# Patient Record
Sex: MALE | Race: WHITE
[De-identification: names, ages, dates, MRNs, and addresses within clinical notes are randomized per-mention and may not be internally consistent; named-entity substitution may affect disease eponyms.]

---

## 2017-03-20 ENCOUNTER — HOSPITAL ENCOUNTER (OUTPATIENT)
Dept: HOSPITAL 84 - D.ECHO | Age: 77
Discharge: HOME | End: 2017-03-20
Attending: PSYCHIATRY & NEUROLOGY
Payer: MEDICARE

## 2017-03-20 VITALS — BODY MASS INDEX: 32.9 KG/M2

## 2017-03-20 DIAGNOSIS — M47.9: ICD-10-CM

## 2017-03-20 DIAGNOSIS — G45.0: Primary | ICD-10-CM

## 2017-03-28 NOTE — EC
PATIENT:LIANET PATIÑO             DATE OF SERVICE: 03/20/17
SEX: M                                  MEDICAL RECORD: [id number]
DATE OF BIRTH: [birth year]                        [facility]          
AGE OF PATIENT: 76                             ADMISSION DATE: 03/20/17
 
REFERRING PHYSICIAN:                               
 
INTERPRETING PHYSICIAN: EVAN HERMOSILLO MD             
 
 
 
                             ECHOCARDIOGRAM REPORT
  ECHO CHARGES 4               ECHO COMPLETE            
 
 
 
CLINICAL DIAGNOSIS: TIA    HX OF                  
                    CAD/STENT/PACER/HTN           
                         ECHOCARDIOGRAPHIC MEASUREMENTS
      (adult normal given)
        AC root (d.<3.7cm) 3.7    LV Septum d (<1.2 cm> 2.2 
           Valve Excursion 1.3      LV Septum (systole) 2.5 
     Left Atria (s.<4.0cm> 4.0           LVPW d(<1.2cm) 1.6 
             RV (d.<2.3cm) 4.3            LVPW (sytole) 1.8 
       LV diastole(<5.6CM) 3.7        MV E-F(>70mm/sec)     
                LV systole 2.9            LVOT Diameter 1.8 
            MV exc.(>10mm)     
Est.ejection fraction (50-75%)     Pericardial Effusion N
 
   DOPPLER:
     LVIT       A 40.0 E 104 
       LA      RVSP     
     LVOT 90   AOP1/2T     
  Asc. Ao 115 
     RVOT 95  
       RA     
        
 AV Gradient Peak 5.33  AV Mean 2.20  AV Area 2.0 
 MV Gradient Peak 6.81  MV Mean 2.12  MV Area     
   COMMENTS:                                              
 
 
 Cardiac Sonographer: Rafi JIANG              
      Cardiologist:Rafi Amaya                
             TAPE# PACS           
                                                                                     
 
 
DATE OF SERVICE:  03/20/2017
 
Echocardiogram
 
FINDINGS:
1.  Left ventricular chamber size is within normal limits.  Left ventricular
systolic function is normal.  Overall ejection fraction estimated at 55%.
2.  Left atrium, right atrium, and right ventricular chamber sizes are upper
limits of normal, left atrium measures 4.0 cm.
3.  Valvular structures have normal structure and motion.
 
 
 
ECHOCARDIOGRAM REPORT                          [id number]    LIANET PATIÑO     
 
 
4.  Doppler interrogation only reveals trace mitral regurgitation that is not
hemodynamically significant.  No other valvular insufficiency or stenosis.
5.  No evidence of pericardial effusion or left ventricular thrombus.
 
TRANSINT:[id number] Voice Confirmation ID: [id number] DOCUMENT ID: [id number]
                                           
                                           EVAN HERMOSILLO MD             
 
 
 
Electronically Signed by EVAN HERMOSILLO on 03/28/17 at 2070
 
 
 
 
 
 
 
 
 
 
 
 
 
 
 
 
 
 
 
 
 
 
 
 
 
 
 
 
 
 
 
 
 
 
 
CC:                                                             [id number]
DICTATION DATE: 03/20/17 1547     :     03/21/17 0842      DEP CLI 
                                                                      03/20/17
[id number] [place] [address]

## 2017-04-18 ENCOUNTER — HOSPITAL ENCOUNTER (OUTPATIENT)
Dept: HOSPITAL 84 - D.CT | Age: 77
Discharge: HOME | End: 2017-04-18
Attending: PSYCHIATRY & NEUROLOGY
Payer: MEDICARE

## 2017-04-18 VITALS — BODY MASS INDEX: 32.9 KG/M2

## 2017-04-18 DIAGNOSIS — G45.0: Primary | ICD-10-CM

## 2017-04-18 DIAGNOSIS — I67.2: ICD-10-CM

## 2017-10-02 ENCOUNTER — HOSPITAL ENCOUNTER (INPATIENT)
Dept: HOSPITAL 84 - D.M2 | Age: 77
LOS: 3 days | Discharge: HOME | DRG: 190 | End: 2017-10-05
Attending: FAMILY MEDICINE | Admitting: FAMILY MEDICINE
Payer: MEDICARE

## 2017-10-02 VITALS — DIASTOLIC BLOOD PRESSURE: 84 MMHG | SYSTOLIC BLOOD PRESSURE: 117 MMHG

## 2017-10-02 VITALS — SYSTOLIC BLOOD PRESSURE: 118 MMHG | DIASTOLIC BLOOD PRESSURE: 76 MMHG

## 2017-10-02 VITALS — BODY MASS INDEX: 33.5 KG/M2 | BODY MASS INDEX: 21.4 KG/M2

## 2017-10-02 VITALS — DIASTOLIC BLOOD PRESSURE: 76 MMHG | SYSTOLIC BLOOD PRESSURE: 118 MMHG

## 2017-10-02 DIAGNOSIS — J18.9: ICD-10-CM

## 2017-10-02 DIAGNOSIS — I48.91: ICD-10-CM

## 2017-10-02 DIAGNOSIS — M25.512: ICD-10-CM

## 2017-10-02 DIAGNOSIS — E11.9: ICD-10-CM

## 2017-10-02 DIAGNOSIS — Z95.0: ICD-10-CM

## 2017-10-02 DIAGNOSIS — Z86.73: ICD-10-CM

## 2017-10-02 DIAGNOSIS — G47.33: ICD-10-CM

## 2017-10-02 DIAGNOSIS — Z87.891: ICD-10-CM

## 2017-10-02 DIAGNOSIS — Z79.01: ICD-10-CM

## 2017-10-02 DIAGNOSIS — J44.0: Primary | ICD-10-CM

## 2017-10-02 DIAGNOSIS — J44.1: ICD-10-CM

## 2017-10-02 LAB
INR PPP: 3.08 (ref 0.85–1.17)
PROTHROMBIN TIME: 32 SECONDS (ref 11.6–15)

## 2017-10-02 NOTE — NUR
PAGE AND RETURN CALL TO DR LEPE TO DISCUSS PATIENT'S C/O LEFT SHOULDER
PAIN. ORDERS RECIEVED. WILL ALSO CONSULT ORTHO FOR EVALUATION.

## 2017-10-02 NOTE — NUR
NEW ADMIT FROM DR. LOZANO OFFICE.  ESCORTED FROM ADMISSIONS BY W/C.
ORININTED TO ROOM.  CALL LIGHT IN REACH.  WILL CONT. PLAN OF CARE.

## 2017-10-03 VITALS — SYSTOLIC BLOOD PRESSURE: 115 MMHG | DIASTOLIC BLOOD PRESSURE: 49 MMHG

## 2017-10-03 VITALS — SYSTOLIC BLOOD PRESSURE: 127 MMHG | DIASTOLIC BLOOD PRESSURE: 78 MMHG

## 2017-10-03 VITALS — DIASTOLIC BLOOD PRESSURE: 69 MMHG | SYSTOLIC BLOOD PRESSURE: 125 MMHG

## 2017-10-03 VITALS — DIASTOLIC BLOOD PRESSURE: 56 MMHG | SYSTOLIC BLOOD PRESSURE: 126 MMHG

## 2017-10-03 VITALS — DIASTOLIC BLOOD PRESSURE: 76 MMHG | SYSTOLIC BLOOD PRESSURE: 133 MMHG

## 2017-10-03 VITALS — SYSTOLIC BLOOD PRESSURE: 140 MMHG | DIASTOLIC BLOOD PRESSURE: 83 MMHG

## 2017-10-03 LAB
ALBUMIN SERPL-MCNC: 2.8 G/DL (ref 3.4–5)
ALP SERPL-CCNC: 105 U/L (ref 46–116)
ALT SERPL-CCNC: 31 U/L (ref 10–68)
ANION GAP SERPL CALC-SCNC: 8.2 MMOL/L (ref 8–16)
BASOPHILS NFR BLD AUTO: 0.1 % (ref 0–2)
BILIRUB SERPL-MCNC: 1.1 MG/DL (ref 0.2–1.3)
BUN SERPL-MCNC: 23 MG/DL (ref 7–18)
CALCIUM SERPL-MCNC: 9.6 MG/DL (ref 8.5–10.1)
CHLORIDE SERPL-SCNC: 96 MMOL/L (ref 98–107)
CO2 SERPL-SCNC: 30.7 MMOL/L (ref 21–32)
CREAT SERPL-MCNC: 1 MG/DL (ref 0.6–1.3)
EOSINOPHIL NFR BLD: 0 % (ref 0–7)
ERYTHROCYTE [DISTWIDTH] IN BLOOD BY AUTOMATED COUNT: 13 % (ref 11.5–14.5)
GLOBULIN SER-MCNC: 3.8 G/L
GLUCOSE SERPL-MCNC: 257 MG/DL (ref 74–106)
HCT VFR BLD CALC: 34.7 % (ref 42–54)
HGB BLD-MCNC: 11.6 G/DL (ref 13.5–17.5)
IMM GRANULOCYTES NFR BLD: 0.5 % (ref 0–5)
INR PPP: 2.91 (ref 0.85–1.17)
LYMPHOCYTES NFR BLD AUTO: 4.7 % (ref 15–50)
MCH RBC QN AUTO: 32.2 PG (ref 26–34)
MCHC RBC AUTO-ENTMCNC: 33.4 G/DL (ref 31–37)
MCV RBC: 96.4 FL (ref 80–100)
MONOCYTES NFR BLD: 8.1 % (ref 2–11)
NEUTROPHILS NFR BLD AUTO: 86.6 % (ref 40–80)
OSMOLALITY SERPL CALC.SUM OF ELEC: 275 MOSM/KG (ref 275–300)
PLATELET # BLD: 139 10X3/UL (ref 130–400)
PMV BLD AUTO: 10.4 FL (ref 7.4–10.4)
POTASSIUM SERPL-SCNC: 3.9 MMOL/L (ref 3.5–5.1)
PROT SERPL-MCNC: 6.6 G/DL (ref 6.4–8.2)
PROTHROMBIN TIME: 30.7 SECONDS (ref 11.6–15)
RBC # BLD AUTO: 3.6 10X6/UL (ref 4.2–6.1)
SODIUM SERPL-SCNC: 131 MMOL/L (ref 136–145)
WBC # BLD AUTO: 15.2 10X3/UL (ref 4.8–10.8)

## 2017-10-03 NOTE — NUR
DURING 2100 MED PASS, PT REFUSED NAPROXEN DUE TO CONCERN OVER IMPACT AS A
BLOOD THINNER. PT'S COUMADIN AND ASA ARE BEING HELD IN PREPARATION FOR A CT
ARTHROGRAM TO BE DONE ON THURSDAY AS LONG AS INR COMES DOWN. I EXPLAINED THAT
IT WAS THE PT'S CHOICE TO REFUSE THE MED AND I UNDERSTOOD HIS CONCERN. I
ADVISED HIM TO DISCUSS HIS CONCERN WITH HIS MD. HE STATES THAT HIS SHOULDER
PAIN IS NOT TOO BAD AT THE MOMENT. THE PRN PERCOCETS ARE WORKING VERY WELL,
SOHE DOESN'T REALLY NEED THE NAPROXEN. WILL CONT TO MONITOR.

## 2017-10-03 NOTE — NUR
DR THOMPSON ORDERED CT SCAN WITH ARTHROGRAM. CT SAID THAT PT PT AND INR ARE TOO
ELEVATED AND DR PINEDA SAID TO HOLD ALL BLOOD THINNERS INCLUDING ASA AND
COUMADIN UNTIL AFTER PTS SCAN PLANNED FOR THURSDAY. PLACED MEDICATIONS ON HOLD
AS ORDERED. PT TO BE NPO AFTER MIDNIGHT FOR SCAN PLANNED ON THURSDAY 10/5,
ORDER IS ALREADY IN COMPUTER. PT UPDATED ON THIS INFORMATION

## 2017-10-03 NOTE — NUR
PT ACCIDENTALLY PULLED HIS PIV OUT CATH TIP INTACT. PT IS EATING LUNCH RIGHT
NOW WILL CALL WHEN READY FOR ME TO RESITE HIS PIV

## 2017-10-03 NOTE — NUR
ROUNDING NOTE: PT IS AWAKE, A&O X3 SITTING UP IN BED. PT HAS LEFT FOREARM
SALINE LOC. HE IS WEARING HIS OWN ELIAS HOSE AND REFUSES TO WEAR OUR SCDS FOR
DVT PROPHYLAXIS. PT REPORTS CONTINUED LEFT SHOULDER PAIN, BUT STATES THAT HIS
PAIN HAS IMPROVED NICELY WITH PO PERCOCET PER MD ORDER. PT'S COUMADIN AND ASA
ARE BEING HELD IN PREP FOR CT ARTHROGRAM ON THURSDAY TO FURTHER EVALUATE HIS
LEFT SHOULDER PAIN. PT IS UP AD INGRID TO BR. REPORTS VOIDING AND BM X1. DENIES
ANY OTHER NEEDS, OTHER THAN KEEPING DOOR SHUT. WILL CONT TO MONITOR.

## 2017-10-04 VITALS — SYSTOLIC BLOOD PRESSURE: 132 MMHG | DIASTOLIC BLOOD PRESSURE: 57 MMHG

## 2017-10-04 VITALS — SYSTOLIC BLOOD PRESSURE: 113 MMHG | DIASTOLIC BLOOD PRESSURE: 63 MMHG

## 2017-10-04 VITALS — DIASTOLIC BLOOD PRESSURE: 68 MMHG | SYSTOLIC BLOOD PRESSURE: 133 MMHG

## 2017-10-04 VITALS — SYSTOLIC BLOOD PRESSURE: 136 MMHG | DIASTOLIC BLOOD PRESSURE: 24 MMHG

## 2017-10-04 VITALS — DIASTOLIC BLOOD PRESSURE: 77 MMHG | SYSTOLIC BLOOD PRESSURE: 148 MMHG

## 2017-10-04 LAB
ALBUMIN SERPL-MCNC: 2.6 G/DL (ref 3.4–5)
ALP SERPL-CCNC: 102 U/L (ref 46–116)
ALT SERPL-CCNC: 28 U/L (ref 10–68)
ANION GAP SERPL CALC-SCNC: 8.1 MMOL/L (ref 8–16)
BASOPHILS NFR BLD AUTO: 0 % (ref 0–2)
BILIRUB SERPL-MCNC: 0.51 MG/DL (ref 0.2–1.3)
BUN SERPL-MCNC: 29 MG/DL (ref 7–18)
CALCIUM SERPL-MCNC: 9.5 MG/DL (ref 8.5–10.1)
CHLORIDE SERPL-SCNC: 92 MMOL/L (ref 98–107)
CO2 SERPL-SCNC: 29.6 MMOL/L (ref 21–32)
CREAT SERPL-MCNC: 0.9 MG/DL (ref 0.6–1.3)
EOSINOPHIL NFR BLD: 0 % (ref 0–7)
ERYTHROCYTE [DISTWIDTH] IN BLOOD BY AUTOMATED COUNT: 12.7 % (ref 11.5–14.5)
GLOBULIN SER-MCNC: 4.1 G/L
GLUCOSE SERPL-MCNC: 212 MG/DL (ref 74–106)
HCT VFR BLD CALC: 34.4 % (ref 42–54)
HGB BLD-MCNC: 11.9 G/DL (ref 13.5–17.5)
IMM GRANULOCYTES NFR BLD: 0.5 % (ref 0–5)
INR PPP: 3.04 (ref 0.85–1.17)
LYMPHOCYTES NFR BLD AUTO: 4.9 % (ref 15–50)
MCH RBC QN AUTO: 32.8 PG (ref 26–34)
MCHC RBC AUTO-ENTMCNC: 34.6 G/DL (ref 31–37)
MCV RBC: 94.8 FL (ref 80–100)
MONOCYTES NFR BLD: 7.9 % (ref 2–11)
NEUTROPHILS NFR BLD AUTO: 86.7 % (ref 40–80)
OSMOLALITY SERPL CALC.SUM OF ELEC: 264 MOSM/KG (ref 275–300)
PLATELET # BLD: 167 10X3/UL (ref 130–400)
PMV BLD AUTO: 10.2 FL (ref 7.4–10.4)
POTASSIUM SERPL-SCNC: 3.7 MMOL/L (ref 3.5–5.1)
PROT SERPL-MCNC: 6.7 G/DL (ref 6.4–8.2)
PROTHROMBIN TIME: 31.7 SECONDS (ref 11.6–15)
RBC # BLD AUTO: 3.63 10X6/UL (ref 4.2–6.1)
SODIUM SERPL-SCNC: 126 MMOL/L (ref 136–145)
WBC # BLD AUTO: 15.2 10X3/UL (ref 4.8–10.8)

## 2017-10-04 NOTE — NUR
PT STATES THAT HE HAS HAD A BM EARLIER THAT WAS MORE FORMED FROM THE ONES
BEFORE. PT STATES HE THINKS THE ANTIBIOTICS ARE CAUSING HIM TO HAVE DIARRHEA
AND WOULD LIKE TO START TAKING A PROBIOTIC. PT EATING CHOCOLATE ICE CREAM FOR
HIS HS SNACK. DENIES ANY OTHER NEEDS. CONTINUE TO MONITOR CLOSELY.

## 2017-10-04 NOTE — NUR
DR THOMPSON CANCELLED PT CT SCAN AND ARTHROGRAM. RESTARTED BLOOD THINNERS SINCE
THEY WERE ON HOLD FOR THIS SCAN.

## 2017-10-04 NOTE — NUR
PT HAS EXCORIATED PERINEAL AREA. CALLED DR PETERSON OFFICE TO ASK FOR AN ORDER
FOR CALMOSEPTINE. SPOKE WITH NURSE, SHE WILL CALL ME BACK

## 2017-10-04 NOTE — NUR
PT LYING IN BED, TALKING ON HIS CELL PHONE, AWAKE, ALERT, ORIENTED. PT DENIES
ANY ACUTE NEEDS AT THIS TIME. WILL GIVE PT AN HS SNACK WITH HIS INSULIN.
CONTINUE TO MONITOR PT CLOSELY. BED LOW, CALL LIGHT IN REACH, SIDE RAILS X 2,
HOB 30 DEGREES.

## 2017-10-05 VITALS — SYSTOLIC BLOOD PRESSURE: 122 MMHG | DIASTOLIC BLOOD PRESSURE: 68 MMHG

## 2017-10-05 LAB
ALBUMIN SERPL-MCNC: 2.5 G/DL (ref 3.4–5)
ALP SERPL-CCNC: 84 U/L (ref 46–116)
ALT SERPL-CCNC: 31 U/L (ref 10–68)
ANION GAP SERPL CALC-SCNC: 10.4 MMOL/L (ref 8–16)
BASOPHILS NFR BLD AUTO: 0 % (ref 0–2)
BILIRUB SERPL-MCNC: 0.5 MG/DL (ref 0.2–1.3)
BUN SERPL-MCNC: 27 MG/DL (ref 7–18)
CALCIUM SERPL-MCNC: 9.2 MG/DL (ref 8.5–10.1)
CHLORIDE SERPL-SCNC: 98 MMOL/L (ref 98–107)
CO2 SERPL-SCNC: 29.5 MMOL/L (ref 21–32)
CREAT SERPL-MCNC: 0.8 MG/DL (ref 0.6–1.3)
EOSINOPHIL NFR BLD: 0 % (ref 0–7)
ERYTHROCYTE [DISTWIDTH] IN BLOOD BY AUTOMATED COUNT: 12.5 % (ref 11.5–14.5)
GLOBULIN SER-MCNC: 2.9 G/L
GLUCOSE SERPL-MCNC: 203 MG/DL (ref 74–106)
HCT VFR BLD CALC: 33.9 % (ref 42–54)
HGB BLD-MCNC: 11.6 G/DL (ref 13.5–17.5)
IMM GRANULOCYTES NFR BLD: 0.4 % (ref 0–5)
INR PPP: 3.07 (ref 0.85–1.17)
LYMPHOCYTES NFR BLD AUTO: 6.9 % (ref 15–50)
MCH RBC QN AUTO: 32.1 PG (ref 26–34)
MCHC RBC AUTO-ENTMCNC: 34.2 G/DL (ref 31–37)
MCV RBC: 93.9 FL (ref 80–100)
MONOCYTES NFR BLD: 6.9 % (ref 2–11)
NEUTROPHILS NFR BLD AUTO: 85.8 % (ref 40–80)
OSMOLALITY SERPL CALC.SUM OF ELEC: 278 MOSM/KG (ref 275–300)
PLATELET # BLD: 178 10X3/UL (ref 130–400)
PMV BLD AUTO: 10 FL (ref 7.4–10.4)
POTASSIUM SERPL-SCNC: 3.9 MMOL/L (ref 3.5–5.1)
PROT SERPL-MCNC: 5.4 G/DL (ref 6.4–8.2)
PROTHROMBIN TIME: 32 SECONDS (ref 11.6–15)
RBC # BLD AUTO: 3.61 10X6/UL (ref 4.2–6.1)
SODIUM SERPL-SCNC: 134 MMOL/L (ref 136–145)
WBC # BLD AUTO: 13.7 10X3/UL (ref 4.8–10.8)

## 2017-10-05 NOTE — NUR
WENT OVER DC PAPERWORK WITH PT PT VERBALIZES UNDERSTANDING. DC PIV WITH CATH
TIP INTACT. DC TELE AND RETURNED TO MONITOR TECH. PT WAITING ON HIS WIFE TO
GET HERE TO PICK HIM UP. PT WAS GIVEN SCRIPT FOR PERCOCET.

## 2017-10-16 NOTE — CN
PATIENT NAME:LIANET RODRIGUEZ                           MEDICAL RECORD: [id number]
: [birth year]                                              LOCATION:D. D.2130
ADMIT DATE: 10/02/17                                       ACCOUNT: [id number]
CONSULTING PHYSICIAN:    ABHI BURROWS MD              
                                               
REFERRING PHYSICIAN:     KATIE CHAVEZ MD               
 
 
DATE OF CONSULTATION:  10/03/2017
 
Pulmonary Consultation
 
CONSULT REQUESTING PHYSICIAN:  Dr. Lepe.
 
REASON FOR CONSULTATION:  Cough, shortness of breath.
 
HISTORY OF PRESENT ILLNESS:  Mr. Rodriguez is a 76-year-old very pleasant gentleman
who for the last 2-3 days is sick.  He has a fever of 102.  He was coughing, he
was wheezing.  He has shortness of breath.  The patient having a fall and he has
hurt his left shoulder.  Since his admission, his shortness of breath is a bit
better with nebulizer medication.
 
REVIEW OF SYSTEMS:  Mainly in the history of present illness.
 
PAST MEDICAL HISTORY:
1. COPD.
2. Emphysema.
3. Obstructive sleep apnea.
4. Coronary artery disease.
5. History of sick sinus syndrome.
6. Obstructive sleep apnea.  The patient is not using any CPAP machine.
7. Diabetes mellitus.
8. History of DVT.
9. Chronic anticoagulation on Coumadin.
 
PAST SURGICAL HISTORY:
1.  He has status post pacemaker placement.
2.  He has shoulder surgery.
 
ALLERGIES:  There are no known drug allergy.
 
PERSONAL AND SOCIAL HISTORY:  The patient has smoking history of more than
50-pack year.  He quit 5 years ago.  Nondrinker.
 
FAMILY HISTORY:  Noncontributory.
 
PHYSICAL EXAMINATION:
GENERAL:  Now, the patient is lying comfortably.  He is not in acute distress.
VITAL SIGNS:  Blood pressure 125/69, pulse is 83, respiration is 20, temperature
98.2, SPO2 is 93% on room air.
HEENT:  Conjunctivae pink, sclerae nonicteric.
NECK:  Supple, no JVD.
CHEST:  Chest excursion is minimal on both sides.  There is no wheezing or
crackle at the bases heard.
HEART:  Rhythm regular, normal sound, no murmur.
ABDOMEN:  Soft, bowel sounds present.  No hepatosplenomegaly.
RECTAL:  Deferred.
EXTREMITIES:  No cyanosis, no clubbing, no pedal edema.
 
 
 
CONSULT REPORT                                 [id number]    LIANET RODRIGUEZ         
 
 
SKIN:  Warm, normal turgor.
CENTRAL NERVOUS SYSTEM:  The patient is awake and alert.  There are no obvious
cranial nerve abnormalities.  The gait was not tested.
 
LABORATORY DATA:  CBC:  WBC 15.2, hemoglobin 11.6, hematocrit 34.7 and the
platelet count 139.  Chemistry:  Sodium is 131, potassium is 3.9, BUN is 23,
creatinine is 1, glucose 257.  INR is 2.91.  The chest radiograph is not
available for review.
 
IMPRESSION:
1. Acute exacerbation of chronic obstructive pulmonary disease and possible
pneumonia by the chest x-ray at Dr. Lepe's office consistent with a
community-acquired pneumonia.
2. Leukocytosis secondary to pneumonia.
3. Febrile illness secondary to pneumonia.
4. Obstructive sleep apnea, not using the CPAP machine.
5. History of deep venous thrombosis, on chronic anticoagulation.
 
RECOMMENDATION:  Continue methylprednisolone IV, Levaquin IV,
albuterol/ipratropium nebulizer.  Start on Brovana, budesonide nebulizer. 
Follow up labs and chest radiograph in the morning.
 
Dr. Lepe, thank you for involving me in the care of Mr. Rodriguez.
 
TRANSINT:[id number] Voice Confirmation ID: [id number] DOCUMENT ID: [id number]
                                           
                                           ABHI BURROWS MD              
 
 
 
Electronically Signed by ABHI BURROWS on 10/16/17 at 1143
 
 
 
 
 
 
 
 
 
 
 
 
 
 
 
 
CC: KRYSTAL LEPE MD                                  [id number]
DICTATION DATE: 10/03/17 164     :     10/03/17 2317      DIS IN  
                                                                      10/05/17
[address] [place] [address]

## 2017-10-22 ENCOUNTER — HOSPITAL ENCOUNTER (INPATIENT)
Dept: HOSPITAL 84 - D.ER | Age: 77
LOS: 10 days | Discharge: HOME HEALTH | DRG: 872 | End: 2017-11-01
Attending: FAMILY MEDICINE | Admitting: FAMILY MEDICINE
Payer: MEDICARE

## 2017-10-22 VITALS — BODY MASS INDEX: 31.9 KG/M2

## 2017-10-22 VITALS — SYSTOLIC BLOOD PRESSURE: 105 MMHG | DIASTOLIC BLOOD PRESSURE: 58 MMHG

## 2017-10-22 DIAGNOSIS — J44.1: ICD-10-CM

## 2017-10-22 DIAGNOSIS — I48.91: ICD-10-CM

## 2017-10-22 DIAGNOSIS — Z79.01: ICD-10-CM

## 2017-10-22 DIAGNOSIS — G35: ICD-10-CM

## 2017-10-22 DIAGNOSIS — A41.01: Primary | ICD-10-CM

## 2017-10-22 DIAGNOSIS — D64.9: ICD-10-CM

## 2017-10-22 DIAGNOSIS — I10: ICD-10-CM

## 2017-10-22 DIAGNOSIS — J98.09: ICD-10-CM

## 2017-10-22 DIAGNOSIS — B37.0: ICD-10-CM

## 2017-10-22 DIAGNOSIS — J45.901: ICD-10-CM

## 2017-10-22 DIAGNOSIS — E11.9: ICD-10-CM

## 2017-10-22 DIAGNOSIS — Z95.0: ICD-10-CM

## 2017-10-22 DIAGNOSIS — I25.10: ICD-10-CM

## 2017-10-22 LAB
ALBUMIN SERPL-MCNC: 2.8 G/DL (ref 3.4–5)
ALP SERPL-CCNC: 125 U/L (ref 46–116)
ALT SERPL-CCNC: 20 U/L (ref 10–68)
ANION GAP SERPL CALC-SCNC: 10.7 MMOL/L (ref 8–16)
BASOPHILS NFR BLD AUTO: 0.2 % (ref 0–2)
BILIRUB SERPL-MCNC: 0.96 MG/DL (ref 0.2–1.3)
BUN SERPL-MCNC: 15 MG/DL (ref 7–18)
CALCIUM SERPL-MCNC: 9.3 MG/DL (ref 8.5–10.1)
CHLORIDE SERPL-SCNC: 98 MMOL/L (ref 98–107)
CK MB SERPL-MCNC: 0.1 U/L (ref 0–3.6)
CK SERPL-CCNC: 47 UL (ref 21–232)
CO2 SERPL-SCNC: 29.9 MMOL/L (ref 21–32)
CREAT SERPL-MCNC: 0.9 MG/DL (ref 0.6–1.3)
EOSINOPHIL NFR BLD: 0.6 % (ref 0–7)
ERYTHROCYTE [DISTWIDTH] IN BLOOD BY AUTOMATED COUNT: 12.8 % (ref 11.5–14.5)
GLOBULIN SER-MCNC: 4.1 G/L
GLUCOSE SERPL-MCNC: 151 MG/DL (ref 74–106)
HCT VFR BLD CALC: 34.1 % (ref 42–54)
HGB BLD-MCNC: 11.4 G/DL (ref 13.5–17.5)
IMM GRANULOCYTES NFR BLD: 0.2 % (ref 0–5)
LYMPHOCYTES NFR BLD AUTO: 7.4 % (ref 15–50)
MCH RBC QN AUTO: 31.8 PG (ref 26–34)
MCHC RBC AUTO-ENTMCNC: 33.4 G/DL (ref 31–37)
MCV RBC: 95.3 FL (ref 80–100)
MONOCYTES NFR BLD: 12 % (ref 2–11)
NEUTROPHILS NFR BLD AUTO: 79.6 % (ref 40–80)
NT-PROBNP SERPL-MCNC: 258 PG/ML (ref 0–450)
OSMOLALITY SERPL CALC.SUM OF ELEC: 273 MOSM/KG (ref 275–300)
PLATELET # BLD: 137 10X3/UL (ref 130–400)
PMV BLD AUTO: 9.3 FL (ref 7.4–10.4)
POTASSIUM SERPL-SCNC: 3.6 MMOL/L (ref 3.5–5.1)
PROT SERPL-MCNC: 6.9 G/DL (ref 6.4–8.2)
RBC # BLD AUTO: 3.58 10X6/UL (ref 4.2–6.1)
SODIUM SERPL-SCNC: 135 MMOL/L (ref 136–145)
TROPONIN I SERPL-MCNC: < 0.017 NG/ML (ref 0–0.06)
WBC # BLD AUTO: 8.9 10X3/UL (ref 4.8–10.8)

## 2017-10-22 NOTE — HEMODYNAMI
PATIENT:LIANET PATIÑO                              MEDICAL RECORD: [id number]
: [birth year]                                            LOCATION:D.[facility]17
ACCT# [id number]                                       ADMISSION DATE: 10/22/17
 
 
 Generatedon:10/31/804861:58
Patient name: LIANET PATIÑO Patient #: [id number] Visit #: [id number] SSN: :
1940 Date of study: 10/31/2017
Page: Of
Hemodynamic Procedure Report
****************************
Patient Data
Patient Demographics
Procedure consent was obtained
First Name: LIANET           Gender: Male
Last Name: HAROON           : 1940
Middle Initial: JAY      Age: 76 year(s)
Patient #: [id number]       Race: Unknown
Visit #: [id number]
Accession #:
[id number]
Additional ID: [id number]
Contact details
Address: [address] [place] [address]      Phone: [phone]
State: [place]
City: [place]
Zip code: [id number]
Admission
Admission Data
Admission Date: 10/22/2017  Admission Time: 17:56
Room #: D.2217
Procedure
Procedure Types
Cath Procedure
Diagnostic Procedure
BARRIE
Procedure Description
Procedure Date
Procedure Date: 10/31/2017
Procedure Start Time: 10:44
Procedure End Time: 10:57
Procedure Staff
Name                            Function
Hao Morillo MD                Performing Physician
Elissa Feldman RN                  Nurse
Bowen Salinas RT                  Monitor
Procedure Data
Cath Procedure
Fluoroscopy
Diagnostic fluoroscopy      Total fluoroscopy Time: 0
time: 0 min                 min
Diagnostic fluoroscopy      Total fluoroscopy dose: 0
dose: 0 mGy                 mGy
Contrast Material
Contrast Material Type                       Amount (ml)
Isovue 300                                   0
Estimated blood loss: 0 ml
Procedure Complications
No complications
 
Procedure Medications
Medication           Administration Route Dosage
Oxygen               NC                   2 l/min
Hurricaine [place]     P.O.                 1 Sprays
Refer to Anesthesia
Notes for Sedation
Medications
Hemodynamics
Rest
Pre Cath      Intra         NCS           Post Cath
Vital Signs
Time     Heart  Resp   SPO2 etCO2   NIBP (mmHg) Rhythm  Pain    Sedation
Rate   (ipm)  (%)  (mmHg)                      Status  Level
(bpm)
10:37:47 61     13     95   36.3    152/89(134) NSR     0 (11)  10(A)
, No
pain
10:42:13 78     15     96   37      165/92(136) NSR     0 (11)  10(A)
, No
pain
10:46:34 68     26     97   0       140/78(109) NSR     0 (11)  9(A)
, No
pain
10:51:39 75     18     98   9.8     128/80(108) NSR     0 (11)  9(A)
, No
pain
10:54:59 75     23     97   9.8     126/73(92)  NSR     0 (11)  10(A)
, No
pain
Medications
Time     Medication  Route  Dose   Verified Delivered Reason    Notes  Effective
ness
by       by
10:39:06 Oxygen      NC     2      Hao Bowers    used for
l/min  Ilia Feldman RN   procedure
10:40:06 Hurricaine  P.O.   1      Hao Bowers    Per
Spray              Sprays Ilia Feldman RN   physician
10:40:11 Refer to                  Hao Bowers
Anesthesia                Ilia Feldman RN
Notes for
Sedation
Medications
Procedure Log
Time     Note
10:16:04 Bowen Salinas RT(R) sent for patient. Start room use.
10:16:05 Time tracking: Regular hours
10:16:09 Plan of Care:Hemodynamics will remain stable., Cardiac
rhythm will remain stable., Comfort level will be
maintained., Respiratory function will remain
adequate., Patient/ family verbilizes understanding of
procedure., Procedure tolerated without complication.,
Recovers from procedure without complications..
10:32:16 Patient arrived from Med/Surg to CCL 3. Patient
remains on bed/stretcher for procedure.
10:32:17 Warm blankets applied, and joon hugger turned on for
patient comfort.
10:32:18 Correct patient and procedure confirmed by team.
10:32:19 Signed procedure consent form obtained from patient.
10:32:21 ECG and BP/O2 sat monitors applied to patient.
10:32:59 Dr Vee present and monitoring patient for TIVA.
 
10:33:09 Yo Castro Echo Tech present for BARRIE.
10:36:30 Vital chart was started
10:36:34 Rhythm: sinus rhythm
10:36:35 Full Disclosure recording started
10:36:50 H&P Date Dictated: 10/26/2017 Within 30 days and on
chart..
10:36:57 Pre-procedure instructions explained to patient.
10:36:58 Pre-op teaching completed and patient verbalized
understanding.
10:37:00 Family unavailable.
10:37:02 Patient NPO since Midnight.
10:37:03 Is the patient allergic to Iodine/contrast media? No.
10:37:04 Is patient on blood thinner?Yes
10:37:07 **ACC** The patient was administered the following
blood thiners within the last 24 hours: Coumadin
10:37:10 Patient diabetic? Yes.
10:37:12 If diabetic: On Metformin? No
10:37:16 Previous problem with sedation/anesthesia? No ?
10:37:18 Snore? Yes
10:37:19 Sleep apnea? No
10:37:20 Deviated septum? No
10:37:21 Opens mouth fully? Yes
10:37:21 Sticks out tongue? Yes
10:37:27 Airway obstruction? Yes possible COPD
10:37:33 Dentures? Yes OUT
10:37:46 IV patent on arrival in right forearm with 0.9% NaCl
at [facility].
10:37:48 Lab results completed and on chart.
10:38:16 Pt prepped for BARRIE.
10:38:48 --------ALL STOP TIME OUT------
10:38:49 Final Timeout: patient, procedure, and site verified
with staff and physician. All members of the team are
in agreement.
10:38:54 Physical assessment completed. ASA score P 3 - A
patient with severe systemic disease as per Hao Morillo MD.
10:39:03 Sedation plan: TIVA Propofol
10:39:06 Oxygen 2 l/min NC was administered by Elissa Feldman RN;
used for procedure;
10:40:06 Hurricaine Spray 1 Sprays P.O. was administered by
Elissa Feldman RN; Per physician;
10:40:11 Refer to Anesthesia Notes for Sedation Medications was
administered by Elissa Feldman RN; ;
10:44:48 Procedure started.
10:44:50 BARRIE started.
10:48:56 BARRIE completed.
10:49:10 Procedure ended.(Physican Out)
10:49:13 Fluoroscopy time 00.00 minutes.
10:49:14 Fluoroscopy dose: 0 mGy
10:49:14 Flurop Dose total: 0
10:49:16 Contrast amount:Isovue 300 0ml.
10:49:23 Post-procedure physical assessment completed. ASA
score P 3 - A patient with severe systemic disease as
per Hao Morillo MD.
10:49:26 Post procedure rhythm: unchanged.
10:49:28 Estimated blood loss: 0 ml
10:49:30 Post procedure instruction explained to
patient.Patient verbalizes understanding.
10:49:31 Patient needs reinforcement of post procedure
teaching.
 
10:49:35 Procedure and supply charges have been captured,
reviewed, submitted and are correct.
10:49:39 Procedure Complication : No complications
10:55:33 Vital chart was stopped
10:55:33 See physician's report for complete and final results.
10:57:18 Report given to Med/Surg.
10:57:28 Patient transfered to Med/Surg with Bed.
10:57:29 Procedure ended.
10:57:29 Full Disclosure recording stopped
10:57:32 End room use (Document Last)
Signature Audit [place]
Stage           Time        Signature      Unsigned
Intra-Procedure 10/31/2017  Bowen Salinas
10:58:14 AM RT(R)
Signatures
Monitor : Bowen Salinas RT Signature :
______________________________
Date : ______________ Time :
______________
 
 
 
 
 
 
 
 
 
 
 
 
 
 
 
 
 
 
 
 
 
 
 
 
 
 
 
 
 
 
 
 
 
 
 
 
[id number] [place] [address]

## 2017-10-23 VITALS — SYSTOLIC BLOOD PRESSURE: 127 MMHG | DIASTOLIC BLOOD PRESSURE: 69 MMHG

## 2017-10-23 VITALS — SYSTOLIC BLOOD PRESSURE: 139 MMHG | DIASTOLIC BLOOD PRESSURE: 75 MMHG

## 2017-10-23 VITALS — SYSTOLIC BLOOD PRESSURE: 126 MMHG | DIASTOLIC BLOOD PRESSURE: 63 MMHG

## 2017-10-23 VITALS — SYSTOLIC BLOOD PRESSURE: 150 MMHG | DIASTOLIC BLOOD PRESSURE: 73 MMHG

## 2017-10-23 VITALS — DIASTOLIC BLOOD PRESSURE: 63 MMHG | SYSTOLIC BLOOD PRESSURE: 130 MMHG

## 2017-10-23 VITALS — DIASTOLIC BLOOD PRESSURE: 52 MMHG | SYSTOLIC BLOOD PRESSURE: 99 MMHG

## 2017-10-23 LAB
ANION GAP SERPL CALC-SCNC: 9.5 MMOL/L (ref 8–16)
BASOPHILS NFR BLD AUTO: 0.1 % (ref 0–2)
BUN SERPL-MCNC: 13 MG/DL (ref 7–18)
CALCIUM SERPL-MCNC: 9.5 MG/DL (ref 8.5–10.1)
CHLORIDE SERPL-SCNC: 99 MMOL/L (ref 98–107)
CO2 SERPL-SCNC: 29.1 MMOL/L (ref 21–32)
CREAT SERPL-MCNC: 0.9 MG/DL (ref 0.6–1.3)
EOSINOPHIL NFR BLD: 0.5 % (ref 0–7)
ERYTHROCYTE [DISTWIDTH] IN BLOOD BY AUTOMATED COUNT: 13 % (ref 11.5–14.5)
GLUCOSE SERPL-MCNC: 152 MG/DL (ref 74–106)
HCT VFR BLD CALC: 32.4 % (ref 42–54)
HGB BLD-MCNC: 10.4 G/DL (ref 13.5–17.5)
IMM GRANULOCYTES NFR BLD: 0.1 % (ref 0–5)
INR PPP: 2.71 (ref 0.85–1.17)
LYMPHOCYTES NFR BLD AUTO: 9.4 % (ref 15–50)
MCH RBC QN AUTO: 31 PG (ref 26–34)
MCHC RBC AUTO-ENTMCNC: 32.1 G/DL (ref 31–37)
MCV RBC: 96.7 FL (ref 80–100)
MONOCYTES NFR BLD: 13.9 % (ref 2–11)
NEUTROPHILS NFR BLD AUTO: 76 % (ref 40–80)
OSMOLALITY SERPL CALC.SUM OF ELEC: 270 MOSM/KG (ref 275–300)
PLATELET # BLD: 134 10X3/UL (ref 130–400)
PMV BLD AUTO: 9.6 FL (ref 7.4–10.4)
POTASSIUM SERPL-SCNC: 3.6 MMOL/L (ref 3.5–5.1)
PROTHROMBIN TIME: 28.9 SECONDS (ref 11.6–15)
RBC # BLD AUTO: 3.35 10X6/UL (ref 4.2–6.1)
SODIUM SERPL-SCNC: 134 MMOL/L (ref 136–145)
WBC # BLD AUTO: 8.7 10X3/UL (ref 4.8–10.8)

## 2017-10-23 PROCEDURE — 0B938ZZ DRAINAGE OF RIGHT MAIN BRONCHUS, VIA NATURAL OR ARTIFICIAL OPENING ENDOSCOPIC: ICD-10-PCS | Performed by: INTERNAL MEDICINE

## 2017-10-23 PROCEDURE — 0B978ZZ DRAINAGE OF LEFT MAIN BRONCHUS, VIA NATURAL OR ARTIFICIAL OPENING ENDOSCOPIC: ICD-10-PCS | Performed by: INTERNAL MEDICINE

## 2017-10-23 NOTE — NUR
ASSISTED BACK TO BED SR UP X2 CALL LIGHT WITHIN REACH ELIAS HOSE FROM HOME ON.
TELM. SHOWS SR WITH 1ST DEGREE AVB HR 95. ANTIBIOTIC HANGING TO IV SITE.

## 2017-10-23 NOTE — NUR
REMAINS NPO FOR CTA. RESPIRATIONS EVEN AND NON LABORED WITH OXYGEN AT 2L
VIA NC. CALL LIGHT IN REACH, WILL CONTINUE WITH PLAN OF CARE.

## 2017-10-23 NOTE — NUR
ASSESSMENT AS PER FLOWSHEET. IV PATENT RT HAND SALINE LOCK AFTER ANTIBIOTICS.
ASSISTED UP TO CHAIR AT BEDSIDE. CALL LIGHT WITHIN REACH VOIDED IN URINAL.O2
ON 2L/M PER NC.

## 2017-10-24 VITALS — SYSTOLIC BLOOD PRESSURE: 134 MMHG | DIASTOLIC BLOOD PRESSURE: 68 MMHG

## 2017-10-24 VITALS — DIASTOLIC BLOOD PRESSURE: 52 MMHG | SYSTOLIC BLOOD PRESSURE: 112 MMHG

## 2017-10-24 VITALS — SYSTOLIC BLOOD PRESSURE: 169 MMHG | DIASTOLIC BLOOD PRESSURE: 89 MMHG

## 2017-10-24 VITALS — SYSTOLIC BLOOD PRESSURE: 149 MMHG | DIASTOLIC BLOOD PRESSURE: 77 MMHG

## 2017-10-24 VITALS — SYSTOLIC BLOOD PRESSURE: 121 MMHG | DIASTOLIC BLOOD PRESSURE: 55 MMHG

## 2017-10-24 VITALS — SYSTOLIC BLOOD PRESSURE: 110 MMHG | DIASTOLIC BLOOD PRESSURE: 68 MMHG

## 2017-10-24 VITALS — SYSTOLIC BLOOD PRESSURE: 161 MMHG | DIASTOLIC BLOOD PRESSURE: 93 MMHG

## 2017-10-24 LAB
ANION GAP SERPL CALC-SCNC: 9.5 MMOL/L (ref 8–16)
BASOPHILS NFR BLD AUTO: 0.1 % (ref 0–2)
BUN SERPL-MCNC: 9 MG/DL (ref 7–18)
CALCIUM SERPL-MCNC: 9.6 MG/DL (ref 8.5–10.1)
CHLORIDE SERPL-SCNC: 95 MMOL/L (ref 98–107)
CO2 SERPL-SCNC: 29.8 MMOL/L (ref 21–32)
CREAT SERPL-MCNC: 0.7 MG/DL (ref 0.6–1.3)
EOSINOPHIL NFR BLD: 1.6 % (ref 0–7)
ERYTHROCYTE [DISTWIDTH] IN BLOOD BY AUTOMATED COUNT: 13.1 % (ref 11.5–14.5)
GLUCOSE SERPL-MCNC: 165 MG/DL (ref 74–106)
HCT VFR BLD CALC: 34 % (ref 42–54)
HGB BLD-MCNC: 11.2 G/DL (ref 13.5–17.5)
IMM GRANULOCYTES NFR BLD: 0.2 % (ref 0–5)
INR PPP: 2.16 (ref 0.85–1.17)
LYMPHOCYTES NFR BLD AUTO: 16.7 % (ref 15–50)
MCH RBC QN AUTO: 31.5 PG (ref 26–34)
MCHC RBC AUTO-ENTMCNC: 32.9 G/DL (ref 31–37)
MCV RBC: 95.8 FL (ref 80–100)
MONOCYTES NFR BLD: 19.7 % (ref 2–11)
NEUTROPHILS NFR BLD AUTO: 61.7 % (ref 40–80)
OSMOLALITY SERPL CALC.SUM OF ELEC: 265 MOSM/KG (ref 275–300)
PLATELET # BLD: 143 10X3/UL (ref 130–400)
PMV BLD AUTO: 9.9 FL (ref 7.4–10.4)
POTASSIUM SERPL-SCNC: 3.3 MMOL/L (ref 3.5–5.1)
PROTHROMBIN TIME: 24.2 SECONDS (ref 11.6–15)
RBC # BLD AUTO: 3.55 10X6/UL (ref 4.2–6.1)
SODIUM SERPL-SCNC: 131 MMOL/L (ref 136–145)
WBC # BLD AUTO: 8.6 10X3/UL (ref 4.8–10.8)

## 2017-10-24 NOTE — NUR
RESTING IN BED SR UP X2 CALL LIGHT WITHIN REACH SCD'S ON. C/O INDIGESTION.
TUMS TAB 2 PO GIVEN FOR INDIGESTION ALONG WITH SALTINE CRACKERS.

## 2017-10-24 NOTE — NUR
* Is the patient Alert and Oriented? Yes  0
* How many steps to enter\exit or inside your home? 0  0
* PCP KATHY  0
* Pharmacy [facility] IN [place]  0
* Preadmission Environment Home with Family  0
* ADLs Independent  0
* Equipment CPAP  0
* List name and contact numbers for known caregivers / representatives who
currently or will assist patient after discharge: BENITEZ PATIÑO (WIFE)
[phone]  0
* Community resources currently utilized None  0
* Additional services required to return to the preadmission environment? Yes
0
* Can the patient safely return to the preadmission environment? Yes  0
* Has this patient been hospitalized within the prior 30 days at any hospital?
Yes  0
    Grand Total:  0

## 2017-10-24 NOTE — NUR
ASSESSMENT AS PER FLOWSHEET. PT SITTING IN CHAIR AT BEDSIDE. IV PATENT LEFT
HAND SALINE LOCKED. ANTIBIOTIC HUNG AS PER MAR.

## 2017-10-24 NOTE — NUR
PT IV LEAKING, IV RESITED TO LEFT HAD. 22G SECURED WITH OP-SITE AND TAPE
FLUSHED WITH 10CC OF NS. BED IN LOW POSITION AND CALL LIGHT WITHIN REACH. WILL
CONTINUE TO MONITOR.

## 2017-10-24 NOTE — NUR
Patient Name: LIANET RODRIGUEZ Admission Status: ER
Accout number: [id number] Admission Date: 10-
: 1940 Admission Diagnosis:PNEUMONIA, UNSPECIFIED ORGANISM
Attending: KATIE CHAVEZ Current LOS: 2
 
Anticipated DC Date:
 
Planned Disposition: Home or Self Care
Primary Insurance: MEDICARE A & B
 
 
Discharge Planning Comments: CM met with patient to assess discharge planning
needs. Patient lives independently at home with his wife, who will be the one
to take him home at discharge. Patient stated that he does not have any stairs
to enter in his home. He has a CPAP machine, but that is it. Patient denies
any use or need for HH at this time. CM will continue to follow and assist
with discharge planning needs.
 
PCP: Estefania Esquivel in [facility]
Leydi Rodriguez ( [phone])
 
 
 
 
 
 
: Kasey Khan

## 2017-10-24 NOTE — NUR
RECIEVED PT DURING WALKING ROUNDS. PT RESTING IN BED WITH COMPLAINTS OF PAIN
OF A 2 ON A SCALE OF 1-10. NO MEDICATION TO BE GIVEN. ASSESSMENT DONE PER
FLOWSHEET. BED IN LOW POSITION AND CALL LIGHT WITHIN REACH. WILL CONTINUE TO
MONITOR.

## 2017-10-24 NOTE — NUR
SUPERVISOR NAOMI RODRIGUEZ RN HERE TO PILL MEDICATION. SOLUMEDROL 40MG IVP GIVEN AS
ORDERED.PT SITTING UPRIGHT IN CHAIR AT BEDSIDE. IV SALINE LOCKED.

## 2017-10-25 VITALS — DIASTOLIC BLOOD PRESSURE: 76 MMHG | SYSTOLIC BLOOD PRESSURE: 196 MMHG

## 2017-10-25 VITALS — SYSTOLIC BLOOD PRESSURE: 172 MMHG | DIASTOLIC BLOOD PRESSURE: 60 MMHG

## 2017-10-25 VITALS — SYSTOLIC BLOOD PRESSURE: 135 MMHG | DIASTOLIC BLOOD PRESSURE: 74 MMHG

## 2017-10-25 VITALS — DIASTOLIC BLOOD PRESSURE: 72 MMHG | SYSTOLIC BLOOD PRESSURE: 124 MMHG

## 2017-10-25 VITALS — SYSTOLIC BLOOD PRESSURE: 144 MMHG | DIASTOLIC BLOOD PRESSURE: 74 MMHG

## 2017-10-25 VITALS — SYSTOLIC BLOOD PRESSURE: 141 MMHG | DIASTOLIC BLOOD PRESSURE: 78 MMHG

## 2017-10-25 LAB
ANION GAP SERPL CALC-SCNC: 12.2 MMOL/L (ref 8–16)
APTT BLD: 54.2 SECONDS (ref 22.8–39.4)
BASOPHILS NFR BLD AUTO: 0 % (ref 0–2)
BUN SERPL-MCNC: 13 MG/DL (ref 7–18)
CALCIUM SERPL-MCNC: 9.9 MG/DL (ref 8.5–10.1)
CHLORIDE SERPL-SCNC: 93 MMOL/L (ref 98–107)
CO2 SERPL-SCNC: 31.1 MMOL/L (ref 21–32)
CREAT SERPL-MCNC: 0.9 MG/DL (ref 0.6–1.3)
EOSINOPHIL NFR BLD: 0 % (ref 0–7)
ERYTHROCYTE [DISTWIDTH] IN BLOOD BY AUTOMATED COUNT: 12.6 % (ref 11.5–14.5)
GLUCOSE SERPL-MCNC: 229 MG/DL (ref 74–106)
HCT VFR BLD CALC: 36.4 % (ref 42–54)
HGB BLD-MCNC: 12.2 G/DL (ref 13.5–17.5)
IMM GRANULOCYTES NFR BLD: 0.2 % (ref 0–5)
INR PPP: 2.23 (ref 0.85–1.17)
LYMPHOCYTES NFR BLD AUTO: 11.2 % (ref 15–50)
MCH RBC QN AUTO: 31.2 PG (ref 26–34)
MCHC RBC AUTO-ENTMCNC: 33.5 G/DL (ref 31–37)
MCV RBC: 93.1 FL (ref 80–100)
MONOCYTES NFR BLD: 9.9 % (ref 2–11)
NEUTROPHILS NFR BLD AUTO: 78.7 % (ref 40–80)
OSMOLALITY SERPL CALC.SUM OF ELEC: 272 MOSM/KG (ref 275–300)
PLATELET # BLD: 186 10X3/UL (ref 130–400)
PMV BLD AUTO: 9.7 FL (ref 7.4–10.4)
POTASSIUM SERPL-SCNC: 3.3 MMOL/L (ref 3.5–5.1)
PROTHROMBIN TIME: 24.7 SECONDS (ref 11.6–15)
RBC # BLD AUTO: 3.91 10X6/UL (ref 4.2–6.1)
SODIUM SERPL-SCNC: 133 MMOL/L (ref 136–145)
WBC # BLD AUTO: 8.9 10X3/UL (ref 4.8–10.8)

## 2017-10-25 NOTE — NUR
RECIEVED PT DURING WALKING ROUNDS. PT RESTING IN BED WITH NO COMPLAINTS OF
PAIN OR DISCOMFORT AT THIS TIME. ASSESSMENT DONE PER FLOWSHEET. BED IN LOW
POSITION AND CALL LIGHT WITHIN REACH. WILL CONTINUE TO MONITOR.

## 2017-10-26 VITALS — SYSTOLIC BLOOD PRESSURE: 139 MMHG | DIASTOLIC BLOOD PRESSURE: 59 MMHG

## 2017-10-26 VITALS — SYSTOLIC BLOOD PRESSURE: 141 MMHG | DIASTOLIC BLOOD PRESSURE: 69 MMHG

## 2017-10-26 VITALS — SYSTOLIC BLOOD PRESSURE: 136 MMHG | DIASTOLIC BLOOD PRESSURE: 69 MMHG

## 2017-10-26 VITALS — DIASTOLIC BLOOD PRESSURE: 70 MMHG | SYSTOLIC BLOOD PRESSURE: 140 MMHG

## 2017-10-26 VITALS — DIASTOLIC BLOOD PRESSURE: 94 MMHG | SYSTOLIC BLOOD PRESSURE: 124 MMHG

## 2017-10-26 LAB
ANION GAP SERPL CALC-SCNC: 9.1 MMOL/L (ref 8–16)
BASOPHILS NFR BLD AUTO: 0 % (ref 0–2)
BUN SERPL-MCNC: 15 MG/DL (ref 7–18)
CALCIUM SERPL-MCNC: 9.8 MG/DL (ref 8.5–10.1)
CHLORIDE SERPL-SCNC: 95 MMOL/L (ref 98–107)
CO2 SERPL-SCNC: 31.6 MMOL/L (ref 21–32)
CREAT SERPL-MCNC: 0.7 MG/DL (ref 0.6–1.3)
EOSINOPHIL NFR BLD: 0 % (ref 0–7)
ERYTHROCYTE [DISTWIDTH] IN BLOOD BY AUTOMATED COUNT: 12.4 % (ref 11.5–14.5)
GLUCOSE SERPL-MCNC: 224 MG/DL (ref 74–106)
HCT VFR BLD CALC: 32.9 % (ref 42–54)
HGB BLD-MCNC: 11.2 G/DL (ref 13.5–17.5)
IMM GRANULOCYTES NFR BLD: 0.2 % (ref 0–5)
INR PPP: 1.98 (ref 0.85–1.17)
INR PPP: 2.95 (ref 0.85–1.17)
LYMPHOCYTES NFR BLD AUTO: 11.7 % (ref 15–50)
MCH RBC QN AUTO: 31.8 PG (ref 26–34)
MCHC RBC AUTO-ENTMCNC: 34 G/DL (ref 31–37)
MCV RBC: 93.5 FL (ref 80–100)
MONOCYTES NFR BLD: 13.7 % (ref 2–11)
NEUTROPHILS NFR BLD AUTO: 74.4 % (ref 40–80)
OSMOLALITY SERPL CALC.SUM OF ELEC: 272 MOSM/KG (ref 275–300)
PLATELET # BLD: 214 10X3/UL (ref 130–400)
PMV BLD AUTO: 9.5 FL (ref 7.4–10.4)
POTASSIUM SERPL-SCNC: 3.7 MMOL/L (ref 3.5–5.1)
PROTHROMBIN TIME: 22.5 SECONDS (ref 11.6–15)
PROTHROMBIN TIME: 31 SECONDS (ref 11.6–15)
RBC # BLD AUTO: 3.52 10X6/UL (ref 4.2–6.1)
SODIUM SERPL-SCNC: 132 MMOL/L (ref 136–145)
WBC # BLD AUTO: 9.7 10X3/UL (ref 4.8–10.8)

## 2017-10-26 NOTE — NUR
REPORT RECEIVED AND CARE OF PT ASSUMED. PT LYING IN HIGH GONZALEZ'S POSITION
WITH EYES CLOSED AND EASY RESPIRATIONS. O2 IN USE VIA NC AT 2L.  WILL MONITOR
[place] FOR NEEDS.

## 2017-10-26 NOTE — NUR
PATIENT SITTING UP IN BED EATING AT THIS TIME. NO COMPLAINTS. WAS UP TO
SHOWER. BSCDS OFF AT HTIS TIME. NO SKIN BREAKDOWN TO LEGS NOTED. CALL LIGHT
WITHIN REACH.

## 2017-10-26 NOTE — HP
PATIENT: LIANET PATIÑO                             MEDICAL RECORD: [id number]
ACCOUNT: [id number]                                    LOCATION:D.MS [id number]
: 38                                            ADMISSION DATE: 10/22/17
                                                         
 
                             HISTORY AND PHYSICAL EXAMINATION
 
 
Admission History and Physical
 
DATE OF ADMISSION:  10/22/2017
 
CHIEF COMPLAINT:  Fever, chills and shortness of breath.
 
HISTORY OF PRESENT ILLNESS:  This is a 76-year-old white male followed by Dr. Mac, who called me earlier today, saying he had been in the hospital just a
few weeks ago with fever and chills and was treated for respiratory infection
and he followed up with Dr. Mac and was doing well, on doxycycline, but
states he has had the acute onset just a couple of days ago with fever in a 101
to 102 range and he is having more and more shortness of breath now.  I
recommended he come into the Emergency Department to be seen.  Chest x-ray done
today compared with previous film on 10/04/2017 showed nonspecific right lower
lobe haziness.  It was more prominent than it was seen on the last film with his
fever, chills, shortness of breath and this prominence on the chest x-ray, he is
being admitted for pneumonia.
 
PAST MEDICAL AND SURGICAL HISTORY:  Coronary artery disease, hypertension,
diabetes, sick sinus syndrome, history of DVT in both legs in his 30s.  He was
just made aware at the last hospitalization that he does have COPD and a little
asthma.  It is said that he has sleep apnea and he is not sure.  He states his
wife says he does not have apneic episodes.  He does not use a CPAP machine.
 
PAST SURGICAL HISTORY:  Hernia repair, cardiac stents, pacemaker placement.
 
DRUG ALLERGIES:  None.
 
HOME MEDICATIONS:  Aspirin 81 mg a day, Hyzaar 100/25 once a day, Lasix 20 mg
once a day, potassium 10 mEq once a day, sotalol 80 mg one half pill twice a
day, warfarin 2.5 mg once a day.  Breo 100, one inhalation once a day.  Ventolin
HFA 2 puffs q. 4-6 hours p.r.n. wheeze, metformin 500 mg, he takes half pill
twice a day.
 
SOCIAL HISTORY:  , retired.
 
FAMILY HISTORY:  Father  at 56 of colon cancer.  No diabetes, no heart
disease.  Mother  in her early 70s of Parkinson's.  She also had polio as a
child.  Sister has diabetes and a brother  of non-Hodgkin's lymphoma.
 
HABITS:  He is a former smoker.  He has a couple of drinks a night.  He does not
use any drugs.
 
REVIEW OF SYSTEMS:
GENERAL:  He notes a 15-pound weight loss over the last several months trying to
control the portions that he eats.
HEENT:  No particular sinus or allergy problems.
RESPIRATORY:  Again, has been told he has emphysema/COPD and a little asthma,
recent hospitalization with exacerbation and possible pneumonia.
CARDIAC:  History of coronary artery disease with stent, sick sinus syndrome,
 
 
 
HISTORY AND PHYSICAL                           [id number]    LIANET PATIÑO     
 
 
followed by Dr. Amaya.
GASTROINTESTINAL:  Has occasional reflux, dyspepsia, a little constipation.
GENITOURINARY:  No significant problems there.
MUSCULOSKELETAL:  No significant problems there.
NEUROLOGIC:  No headaches or seizures.
PSYCHIATRIC:  No depression or melancholia.
 
PHYSICAL EXAMINATION:
VITAL SIGNS:  Blood pressure 123/72, pulse 92, O2 sat 94% on 2 liters in the ER.
GENERAL:  He is awake, alert, does not appear in acute distress.
HEENT:  Grossly within normal limits.
NECK:  Supple.  No JVD or bruits.
LUNGS:  Decreased breath sounds in the bases bilaterally.  No wheezing heard.
ABDOMEN:  Soft, flat, nontender.
EXTREMITIES:  He has compression stockings on, which he has worn faithfully for
many years.  No significant edema.
NEUROLOGIC:  Intact.
 
LABORATORY DATA:  CBC with a white count of 8900, hemoglobin 11.4, hematocrit
34.1.  Basic metabolic panel is unremarkable.  Lactic acid 1.0.  Liver functions
are normal.  ProBNP 258.  Cardiac enzymes are negative.  Chest x-ray shows
nonspecific right lower lobe haziness, more prominent than last time done on
10/04/2017.
 
ASSESSMENT:
1.  Pneumonia.
2.  Chronic obstructive pulmonary disease.
3.  Noninsulin dependent diabetes.
4.  Hypertension.
 
PLAN:  He is admitted.  We will give him respiratory meds and because of his
recent hospitalization, we will treat this for community-acquired pneumonia. 
Other tests and procedures as warranted.
 
TRANSINT:[id number] Voice Confirmation ID: [id number] DOCUMENT ID: [id number]
 
 
                                           
                                           MARCELINA BALTAZAR MD              
 
 
 
Electronically Signed by MARCELINA BALTAZAR on 10/26/17 at 1305
 
 
 
 
CC:                                                             [id number]
DICTATION DATE: 10/22/17 095     :     10/22/17 1928      ADM IN  
                                                                              
[facility]                                          
[address] [id number]

## 2017-10-26 NOTE — NUR
PATIENT SECOND UNIT FINISHED AT THIS TIME. NO COMPLAINTS OR SIGNS OF DISTRESS.
IV INTACT. VS STABLE. CALL LIGHT WITHIN REACH. BSCDS ON.

## 2017-10-26 NOTE — NUR
PATIENT SECOND UNIT OF PLASMA STARTED. VS STABLE. NO COMPLAINTS. IV INTACT.
CALL LIGHT WITHIN REACH.

## 2017-10-26 NOTE — NUR
REPORT RECIEVED ASSUMED CARE. PATIENT IN BED WITH IV INTACT. NO COMPLAINTS AT
THIS TIME. CALL LIGHT WITHIN REACH.

## 2017-10-26 NOTE — NUR
PATIENT PLASMA DONE AT THIS TIME. VS STABLE. NO OCMPLAINTS AT THIS TIME. IV
INTACT. CALL LIGHT WITHIN REACH.

## 2017-10-27 VITALS — SYSTOLIC BLOOD PRESSURE: 149 MMHG | DIASTOLIC BLOOD PRESSURE: 80 MMHG

## 2017-10-27 VITALS — SYSTOLIC BLOOD PRESSURE: 156 MMHG | DIASTOLIC BLOOD PRESSURE: 83 MMHG

## 2017-10-27 VITALS — SYSTOLIC BLOOD PRESSURE: 154 MMHG | DIASTOLIC BLOOD PRESSURE: 82 MMHG

## 2017-10-27 VITALS — DIASTOLIC BLOOD PRESSURE: 69 MMHG | SYSTOLIC BLOOD PRESSURE: 142 MMHG

## 2017-10-27 VITALS — DIASTOLIC BLOOD PRESSURE: 82 MMHG | SYSTOLIC BLOOD PRESSURE: 161 MMHG

## 2017-10-27 VITALS — SYSTOLIC BLOOD PRESSURE: 138 MMHG | DIASTOLIC BLOOD PRESSURE: 66 MMHG

## 2017-10-27 LAB
INR PPP: 2.01 (ref 0.85–1.17)
INR PPP: 2.1 (ref 0.85–1.17)
PROTHROMBIN TIME: 22.8 SECONDS (ref 11.6–15)
PROTHROMBIN TIME: 23.6 SECONDS (ref 11.6–15)

## 2017-10-27 NOTE — NUR
CALLED LAB ABOUT STAT PT/ INR NOT DONE. STATED THEY WERE BUSY IN ER AND WOULD
BE HERE ASAP. EXPLAINED PATIENT WAS SUPPOSE TO HAVE PROCEDURE AND NEEDED LAB
TO SEE IF IT WOULD BE DONE. STATED THEY WOULD BE HERE TO DRAW ASAP.

## 2017-10-27 NOTE — NUR
SPOKE WITH BAY CHARLES ABOUT PATIENT GETTING BARRIE. STATED IT HAD BEEN CANCELED
DUE TO PT/INR TOO HIGH. EXPLAINED THAT NO ONE HAD NOTIFIED ME OR THE PATIENT.
AND ASKED FOR REGULAR DIET. REG DIET ORDERED FOR PATIENT.

## 2017-10-27 NOTE — NUR
REPORT RECIEVED ASSUMED CARE. PATIENT IN BED WITH NO COMPLAINTS AT THIS TIME.
IV INTACT. CALL LIGHT WITHIN REACH. DR. NARVAEZ ORDERED 2 UNITS OF FFPS. WAITING
FOR BB WHEN READY.

## 2017-10-27 NOTE — NUR
CALLED CATH LAB TO CHECK ON BARRIE TIME. SACHIN ANSWERED AND STATED SHE WOULD
TRANSFER PHONE TO BACK BC SHE DOESNT DEAL WITH THAT. TRANSFERRED WITH NO
ANSWER.

## 2017-10-27 NOTE — NUR
PATIENT COMPLAINTS OF ITCHING. DR. NARVAEZ NOTIFIED. NEW ORDERS RECIEVED AND
CARRIED OUT. PATIENT IV IN RIGHT HAND SWELLING SMALL AMOUNT. STARTED NEW IV
IN LEFT FA 20 G X 1 STICK. TOLERATED WITH SMALL AMOUNT OF MEDS. SOLUMEDROL AND
BENADRYL GIVEN AS ORDERED.

## 2017-10-27 NOTE — NUR
PATIENT SECOND UNIT OF FFPS STARTED. VS STABLE NO COMPLAINTS AT THIS TIME. IV
INTACT. CALL LIGHT WITHIN REACH.

## 2017-10-27 NOTE — NUR
REPORT RECEIVED AND CARE OF PT ASSUMED. PT LYING IN HIGH GONZALEZ'S POSITION
WATCHING TV.  IV IN LEFT HAND PATENT WITH NS INFUSING AT KVO.  WILL MONITOR
CLOSLEY FOR NEEDS.

## 2017-10-28 VITALS — SYSTOLIC BLOOD PRESSURE: 165 MMHG | DIASTOLIC BLOOD PRESSURE: 86 MMHG

## 2017-10-28 VITALS — DIASTOLIC BLOOD PRESSURE: 81 MMHG | SYSTOLIC BLOOD PRESSURE: 174 MMHG

## 2017-10-28 VITALS — DIASTOLIC BLOOD PRESSURE: 98 MMHG | SYSTOLIC BLOOD PRESSURE: 164 MMHG

## 2017-10-28 VITALS — SYSTOLIC BLOOD PRESSURE: 159 MMHG | DIASTOLIC BLOOD PRESSURE: 88 MMHG

## 2017-10-28 VITALS — SYSTOLIC BLOOD PRESSURE: 154 MMHG | DIASTOLIC BLOOD PRESSURE: 72 MMHG

## 2017-10-28 VITALS — SYSTOLIC BLOOD PRESSURE: 152 MMHG | DIASTOLIC BLOOD PRESSURE: 71 MMHG

## 2017-10-28 VITALS — DIASTOLIC BLOOD PRESSURE: 83 MMHG | SYSTOLIC BLOOD PRESSURE: 153 MMHG

## 2017-10-28 VITALS — DIASTOLIC BLOOD PRESSURE: 78 MMHG | SYSTOLIC BLOOD PRESSURE: 153 MMHG

## 2017-10-28 VITALS — SYSTOLIC BLOOD PRESSURE: 145 MMHG | DIASTOLIC BLOOD PRESSURE: 76 MMHG

## 2017-10-28 VITALS — DIASTOLIC BLOOD PRESSURE: 85 MMHG | SYSTOLIC BLOOD PRESSURE: 163 MMHG

## 2017-10-28 VITALS — SYSTOLIC BLOOD PRESSURE: 153 MMHG | DIASTOLIC BLOOD PRESSURE: 77 MMHG

## 2017-10-28 VITALS — DIASTOLIC BLOOD PRESSURE: 71 MMHG | SYSTOLIC BLOOD PRESSURE: 152 MMHG

## 2017-10-28 NOTE — NUR
NO CHANGES IN INITIAL ASSESSMENT. CALL LIGHT IN REACH. SCDs TO BLE. WILL
CONTINUE WITH PLAN OF CARE/

## 2017-10-28 NOTE — NUR
STATES HE IS ITCHING ON HIS BACK AND THIS HAPPENED AFTER THE LAST TIME HE HAD
FFP. SPOKE WITH DR. CHAVEZ. NEW ORDERS FOR BENADRYL.

## 2017-10-28 NOTE — NUR
PT RESTING QUIETLY IN SUPINE POSITION WITH EYES CLOSED AND EVEN RESPIRATIONS.
WILL CONTINUE TO MONITOR FOR NEEDS.

## 2017-10-28 NOTE — NUR
PLACED ORDER FOR CASE MANAGEMENT CONSULT FOR MONDAY MORNING PER PT
REQUEST...WANTS TO WAIT TILL THEN WHEN HIS WIFE IS HERE.

## 2017-10-28 NOTE — NUR
PRBC INFUSION COMPLETE AND LINE FLUSHING. VITALS STABLE AND PT IS AFEBRILE.
CAREGIVER IS AT BEDSIDE.

## 2017-10-28 NOTE — NUR
REPORT RECEIVED AND CARE OF PT ASSUMED. PT LYING IN SEMI GONZALEZ'S POSITION
WITH EYES CLOSED AND UNLABORED BREATING. WILL MONITOR CLOSLEY FOR NEEDS.

## 2017-10-28 NOTE — NUR
SPOKE WITH LAB. STATES REACTION FORM DOES NOT HAVE TO BE FILLED OUT BECAUSE
THE ITCHING STARTED 35 MINUTES AFTER 2ND UNIT WAS COMPLETED.

## 2017-10-28 NOTE — NUR
REPORT RECEIVED AND CARE OF PT ASSUMED. PT LYING IN LOW GONZALEZ'S POSITION WITH
EYES CLOSED. O2 IN USE VIA NC AT 3.5 L.  ANDERSON CATHETER DRAINING TO GRAVITY
WITH YELLOW URINE IN COLLECTION BAG. WILL MONITOR CLOSELY FOR NEEDS.

## 2017-10-28 NOTE — NUR
ASSESSMENT COMPLETED. SCDs TO BLE. DENIES PAIN OR NEEDS. CALL LIGHT IN REACH.
WILL CONTINUE WITH PLAN OF CARE.

## 2017-10-28 NOTE — NUR
PT SITTING UP IN BED WITH NO VISABLE SIGNS OF PAIN OR DISCOMFORT AT THIS TIME.
BED IN LOW POSITION AND CALL LIGHT WITHIN REACH. WILL CONTINUE TO MONITOR.

## 2017-10-28 NOTE — NUR
HS MEDICATIONS GIVEN. PT TURNED PER TURN SCHEDULE. WILL CONTINUE TO MONITOR
FOR NEEDS. BED ALARM IN USE.

## 2017-10-29 VITALS — DIASTOLIC BLOOD PRESSURE: 76 MMHG | SYSTOLIC BLOOD PRESSURE: 144 MMHG

## 2017-10-29 VITALS — DIASTOLIC BLOOD PRESSURE: 97 MMHG | SYSTOLIC BLOOD PRESSURE: 187 MMHG

## 2017-10-29 VITALS — DIASTOLIC BLOOD PRESSURE: 79 MMHG | SYSTOLIC BLOOD PRESSURE: 166 MMHG

## 2017-10-29 VITALS — SYSTOLIC BLOOD PRESSURE: 140 MMHG | DIASTOLIC BLOOD PRESSURE: 77 MMHG

## 2017-10-29 VITALS — SYSTOLIC BLOOD PRESSURE: 136 MMHG | DIASTOLIC BLOOD PRESSURE: 86 MMHG

## 2017-10-29 LAB
BASOPHILS NFR BLD AUTO: 0.1 % (ref 0–2)
EOSINOPHIL NFR BLD: 0.6 % (ref 0–7)
ERYTHROCYTE [DISTWIDTH] IN BLOOD BY AUTOMATED COUNT: 12.5 % (ref 11.5–14.5)
HCT VFR BLD CALC: 36.4 % (ref 42–54)
HGB BLD-MCNC: 12.2 G/DL (ref 13.5–17.5)
IMM GRANULOCYTES NFR BLD: 2.6 % (ref 0–5)
INR PPP: 1.6 (ref 0.85–1.17)
LYMPHOCYTES NFR BLD AUTO: 17.8 % (ref 15–50)
MCH RBC QN AUTO: 31.2 PG (ref 26–34)
MCHC RBC AUTO-ENTMCNC: 33.5 G/DL (ref 31–37)
MCV RBC: 93.1 FL (ref 80–100)
MONOCYTES NFR BLD: 10.7 % (ref 2–11)
NEUTROPHILS NFR BLD AUTO: 68.2 % (ref 40–80)
PLATELET # BLD: 326 10X3/UL (ref 130–400)
PMV BLD AUTO: 9.1 FL (ref 7.4–10.4)
PROTHROMBIN TIME: 19 SECONDS (ref 11.6–15)
RBC # BLD AUTO: 3.91 10X6/UL (ref 4.2–6.1)
WBC # BLD AUTO: 9.8 10X3/UL (ref 4.8–10.8)

## 2017-10-29 NOTE — NUR
PT RESTING QUIETLY IN SEMI GONZALEZ'S POSITION WITH EYES CLOSED AND UNLABORED
BREATHING. WILL CONTINUE TO MONITOR FOR NEEDS. CALL LIGHT WITHIN REACH.

## 2017-10-29 NOTE — NUR
REPORT RECEIVED AND CARE OF PT ASSUMED. PT LYING IN HIGH GONZALEZ'S POSITION
WATCHING TV. IV IN LEFT FA PATENT WITH NS INFUSING AT 20 ML / HR. WILL MONITOR
[place] FOR NEEDS.

## 2017-10-29 NOTE — NUR
IV IN LEFT FA LEAKING. REMOVED WITH CATHETER TIP INTACT. RE-SITED TO RIGHT FA
IN 2 STICKS. IV FLUIDS RE-STARTED. WILL CONTINUE TO MONITOR FOR NEEDS.

## 2017-10-29 NOTE — NUR
LYING IN BED,WITHOUT DISTRESS.ASSESSMENT PER FLOW SHEET.PT WIHTOUT NEEDS AND
DENIES PAIN.CALL LIGHT IN REACH

## 2017-10-30 VITALS — DIASTOLIC BLOOD PRESSURE: 91 MMHG | SYSTOLIC BLOOD PRESSURE: 139 MMHG

## 2017-10-30 VITALS — DIASTOLIC BLOOD PRESSURE: 66 MMHG | SYSTOLIC BLOOD PRESSURE: 134 MMHG

## 2017-10-30 VITALS — DIASTOLIC BLOOD PRESSURE: 81 MMHG | SYSTOLIC BLOOD PRESSURE: 162 MMHG

## 2017-10-30 VITALS — DIASTOLIC BLOOD PRESSURE: 83 MMHG | SYSTOLIC BLOOD PRESSURE: 167 MMHG

## 2017-10-30 VITALS — DIASTOLIC BLOOD PRESSURE: 85 MMHG | SYSTOLIC BLOOD PRESSURE: 159 MMHG

## 2017-10-30 LAB
ANION GAP SERPL CALC-SCNC: 12.1 MMOL/L (ref 8–16)
BASOPHILS NFR BLD AUTO: 0.1 % (ref 0–2)
BUN SERPL-MCNC: 20 MG/DL (ref 7–18)
CALCIUM SERPL-MCNC: 10.3 MG/DL (ref 8.5–10.1)
CHLORIDE SERPL-SCNC: 94 MMOL/L (ref 98–107)
CO2 SERPL-SCNC: 30.1 MMOL/L (ref 21–32)
CREAT SERPL-MCNC: 0.9 MG/DL (ref 0.6–1.3)
EOSINOPHIL NFR BLD: 0.4 % (ref 0–7)
ERYTHROCYTE [DISTWIDTH] IN BLOOD BY AUTOMATED COUNT: 12.6 % (ref 11.5–14.5)
GLUCOSE SERPL-MCNC: 293 MG/DL (ref 74–106)
HCT VFR BLD CALC: 39.4 % (ref 42–54)
HGB BLD-MCNC: 13 G/DL (ref 13.5–17.5)
IMM GRANULOCYTES NFR BLD: 2.9 % (ref 0–5)
INR PPP: 1.55 (ref 0.85–1.17)
LYMPHOCYTES NFR BLD AUTO: 15.5 % (ref 15–50)
MCH RBC QN AUTO: 31 PG (ref 26–34)
MCHC RBC AUTO-ENTMCNC: 33 G/DL (ref 31–37)
MCV RBC: 93.8 FL (ref 80–100)
MONOCYTES NFR BLD: 8.8 % (ref 2–11)
NEUTROPHILS NFR BLD AUTO: 72.3 % (ref 40–80)
OSMOLALITY SERPL CALC.SUM OF ELEC: 278 MOSM/KG (ref 275–300)
PLATELET # BLD: 384 10X3/UL (ref 130–400)
PMV BLD AUTO: 9.2 FL (ref 7.4–10.4)
POTASSIUM SERPL-SCNC: 4.2 MMOL/L (ref 3.5–5.1)
PROTHROMBIN TIME: 18.5 SECONDS (ref 11.6–15)
RBC # BLD AUTO: 4.2 10X6/UL (ref 4.2–6.1)
SODIUM SERPL-SCNC: 132 MMOL/L (ref 136–145)
WBC # BLD AUTO: 12.1 10X3/UL (ref 4.8–10.8)

## 2017-10-30 NOTE — NUR
NUTRITION F/U
CHART REVIEWED. PT TOLERATING REG DIET WITH 100% INTAKE MEALS. REMAINS AT LOW
NUTRITIONAL RISK.
RD FOLLOWING

## 2017-10-30 NOTE — NUR
PT RESTING IN HIGH GONZALEZ'S POSITION WITH EYES CLOSED AND UNLABORED BREATHING.
CALL LIGHT WITHIN REACH.

## 2017-10-30 NOTE — NUR
AWAKE AND ALERT. ORIENTED X3. NO C/O THIS AM. LUNGS ARE CLEAR BILATERALLY BUT
DIMINISHED IN LOWER LOBES. PRODUCTIVE COUGH REPORTED WITH YELLOWISH SPUTUM.
SKIN IS INTACT WITHOUT REDNESS. IV TO RIGHT FOREARM IS PATNET WITHOUT REDNESS
AT INSERTION SITE. SCD'S IN PLACE. DENIES NEEDS. BREAKFAST SERVED IN ROOM.

## 2017-10-30 NOTE — NUR
ASSESSMENT AS PER FLOWSHEET. IV PATENT RT FOREARM OF NS AT 20CC'S/HR SITE
CLEAR. MEDS GIVEN AS PER MAR.

## 2017-10-31 VITALS — SYSTOLIC BLOOD PRESSURE: 134 MMHG | DIASTOLIC BLOOD PRESSURE: 79 MMHG

## 2017-10-31 VITALS — SYSTOLIC BLOOD PRESSURE: 133 MMHG | DIASTOLIC BLOOD PRESSURE: 83 MMHG

## 2017-10-31 VITALS — DIASTOLIC BLOOD PRESSURE: 80 MMHG | SYSTOLIC BLOOD PRESSURE: 140 MMHG

## 2017-10-31 VITALS — SYSTOLIC BLOOD PRESSURE: 138 MMHG | DIASTOLIC BLOOD PRESSURE: 75 MMHG

## 2017-10-31 VITALS — SYSTOLIC BLOOD PRESSURE: 140 MMHG | DIASTOLIC BLOOD PRESSURE: 76 MMHG

## 2017-10-31 VITALS — SYSTOLIC BLOOD PRESSURE: 147 MMHG | DIASTOLIC BLOOD PRESSURE: 81 MMHG

## 2017-10-31 VITALS — SYSTOLIC BLOOD PRESSURE: 153 MMHG | DIASTOLIC BLOOD PRESSURE: 92 MMHG

## 2017-10-31 VITALS — SYSTOLIC BLOOD PRESSURE: 117 MMHG | DIASTOLIC BLOOD PRESSURE: 72 MMHG

## 2017-10-31 VITALS — DIASTOLIC BLOOD PRESSURE: 95 MMHG | SYSTOLIC BLOOD PRESSURE: 140 MMHG

## 2017-10-31 LAB
ANION GAP SERPL CALC-SCNC: 12.4 MMOL/L (ref 8–16)
BASOPHILS NFR BLD AUTO: 0.1 % (ref 0–2)
BUN SERPL-MCNC: 20 MG/DL (ref 7–18)
CALCIUM SERPL-MCNC: 10.2 MG/DL (ref 8.5–10.1)
CHLORIDE SERPL-SCNC: 93 MMOL/L (ref 98–107)
CO2 SERPL-SCNC: 29.2 MMOL/L (ref 21–32)
CREAT SERPL-MCNC: 0.8 MG/DL (ref 0.6–1.3)
EOSINOPHIL NFR BLD: 0.1 % (ref 0–7)
ERYTHROCYTE [DISTWIDTH] IN BLOOD BY AUTOMATED COUNT: 12.6 % (ref 11.5–14.5)
GLUCOSE SERPL-MCNC: 282 MG/DL (ref 74–106)
HCT VFR BLD CALC: 39.6 % (ref 42–54)
HGB BLD-MCNC: 13.3 G/DL (ref 13.5–17.5)
IMM GRANULOCYTES NFR BLD: 2.4 % (ref 0–5)
INR PPP: 1.42 (ref 0.85–1.17)
LYMPHOCYTES NFR BLD AUTO: 13.7 % (ref 15–50)
MCH RBC QN AUTO: 31.4 PG (ref 26–34)
MCHC RBC AUTO-ENTMCNC: 33.6 G/DL (ref 31–37)
MCV RBC: 93.4 FL (ref 80–100)
MONOCYTES NFR BLD: 5.1 % (ref 2–11)
NEUTROPHILS NFR BLD AUTO: 78.6 % (ref 40–80)
OSMOLALITY SERPL CALC.SUM OF ELEC: 273 MOSM/KG (ref 275–300)
PLATELET # BLD: 410 10X3/UL (ref 130–400)
PMV BLD AUTO: 9.2 FL (ref 7.4–10.4)
POTASSIUM SERPL-SCNC: 4.6 MMOL/L (ref 3.5–5.1)
PROTHROMBIN TIME: 17.3 SECONDS (ref 11.6–15)
RBC # BLD AUTO: 4.24 10X6/UL (ref 4.2–6.1)
SODIUM SERPL-SCNC: 130 MMOL/L (ref 136–145)
WBC # BLD AUTO: 13.2 10X3/UL (ref 4.8–10.8)

## 2017-10-31 NOTE — TEE
PATIENT:LIANET PATIÑO             MEDICAL RECORD: [id number]
                                               LOCATION:D.MS     D.221
AGE OF PATIENT: 76                             ADMISSION DATE: 10/22/17
SEX: M   
 
REFERRING PHYSICIAN:                                                             
 
INTERPRETING PHYSICIAN: EVAN MORILLO MD             

 
 
                         TRANSESOPHAGEAL ECHOCARDIOGRAM
                 BARRIE CHARGE  Y
                INDICATIONS: ASSESS FOR ENDOCARDITIS  
 
             PREMEDICATIONS:                               
 
PATIENT'S RESPONSE PROCEDURE                          
 
                     DOPPLER MEASUREMENTS:
            LVIT            LA           PA 98.0      RA     
            LVOT 110      RVOT 79.0 Asc. Ao 141 
AV Gradient Peak 8.0   AV Mean 3.7  AV Area 1.8 
MV Gradient Peak 5.4   MV Mean 1.8  MV Area     
 INTERPRETATION:                          
 
 
 
 
               Doppler:                          
 
                   2-D: ALL VALVES CLEAN         
 
     COLOR FLOW DOPPLER                          
 
   NORMAL SALINE STUDY:                     
 
          MISCELLANOUS:                          
 
             DIAGNOSIS:                          
 
                  PLAN:                          
 
           Cardiologist:1          Dr. Morillo                
   Cardiac Sonographer: Rafi JIANG              
              COMMENTS:                                              
                                                                                     
 
 
DATE OF SERVICE:  10/31/2017
 
Transesophageal echo
 
INDICATION:  Fever of unknown origin, evaluate for endocarditis.
 
FINDINGS:
1.  Left ventricular chamber size is within normal limits.  Left ventricular
systolic function is normal.  Overall ejection fraction estimated at 60%.
 
 
 
TRANSESOPHAGEAL ECHOCARDIOGRAM REPORT                    [id number]    LIANET PATIÑO
 
 
2.  Left atrium, right atrium, and right ventricular chamber sizes are within
normal limits.
3.  Valvular structures have normal structure and motion.  No evidence of
endocarditis.
4.  Doppler interrogation only reveals trace mitral regurgitation, trace
tricuspid regurgitation.  No other valvular insufficiency or stenosis.
5.  No evidence of pericardial effusion or left ventricular thrombus.
 
TRANSINT:[id number] Voice Confirmation ID: [id number] DOCUMENT ID: [id number]
 
 
 
Electronically Signed by EVAN MORILLO on 10/31/17 at 1642
 
 
 
 
 
 
 
 
 
 
 
 
 
 
 
 
 
 
 
 
 
 
 
 
 
 
 
 
 
 
 
 
 
CC:                                                             [id number]
DICTATION DATE: 10/31/17 1052     :     10/31/17 1406      ADM IN  
                                                                              
[facility]                                          
[address] [place] [address]

## 2017-10-31 NOTE — NUR
ASSESSMENT AS PER FLOWSHEET. IV PATENT RT FOREARM OF NS AT 20CC'S/HR SITE
CLEAR. O2 AT 2L/M PER NC WEARS PRN. TELM. SHOWS SR W/BBB AND PAC'S. HR 71.
SCD'S ON. SR UP X2 CALL LIGHT WITHIN REACH. DENIES NEEDS.

## 2017-10-31 NOTE — NUR
AWAKE AND ALERT. ORIENTED X3. NO C/O AT THIS TIME. LUNGS ARE DIMINISHED
THROUGHT LOWER LOBES THIS AM. REPORTS PRODUCTIVE COUGH. WILL MONITOR. SKIN IS
INTACT WITHOUT REDNESS. IV TO RIGHT FOREARM IS PATNET WITHOUT REDNESS AT
INSERTION SITE. SCD;S IN PLACE. DENIES NEEDS. NPO FOR PROCEDURE TODAY.

## 2017-11-01 VITALS — SYSTOLIC BLOOD PRESSURE: 125 MMHG | DIASTOLIC BLOOD PRESSURE: 75 MMHG

## 2017-11-01 VITALS — DIASTOLIC BLOOD PRESSURE: 80 MMHG | SYSTOLIC BLOOD PRESSURE: 212 MMHG

## 2017-11-01 VITALS — DIASTOLIC BLOOD PRESSURE: 75 MMHG | SYSTOLIC BLOOD PRESSURE: 141 MMHG

## 2017-11-01 LAB
INR PPP: 1.35 (ref 0.85–1.17)
PROTHROMBIN TIME: 16.6 SECONDS (ref 11.6–15)

## 2017-11-01 PROCEDURE — 05HC33Z INSERTION OF INFUSION DEVICE INTO LEFT BASILIC VEIN, PERCUTANEOUS APPROACH: ICD-10-PCS | Performed by: INTERNAL MEDICINE

## 2017-11-01 PROCEDURE — B54NZZA ULTRASONOGRAPHY OF LEFT UPPER EXTREMITY VEINS, GUIDANCE: ICD-10-PCS | Performed by: INTERNAL MEDICINE

## 2017-11-01 NOTE — NUR
AWAKE AND ALERT. SCHEDULED MEDICATIONS ADMINISTERED WITHOUT DIFFICULTY AT THIS
TIME. DENIES PAIN OR NEEDS AT PRESENT TIME. CALL LIGHT IN REACH, WILL CONTINUE
WITH PLAN OF CARE.

## 2017-11-01 NOTE — NUR
AWAKE AND ALERT WITH RESPIRATIONS EVEN AND NON LABORED. OXYGEN ON 2L VIA NC.
PROVIDED PT WITH ICE WATER AT THIS TIME. UP AD INGRID. CALL LIGHT IN REACH, WILL
CONTINUE WITH PLAN OF CARE.

## 2017-11-01 NOTE — NUR
PATIENT DISCHARGING TODAY HOME WITH IV ABX FROM [facility] TO
FOLLOW WIFE WILL PICK PATIENT UP AND DRIVE HOME. PATIENT DENIES ANY OTHER
NEEDS AT THIS TIME. CM WILL CONTINUE TO HELP AND ASSIST WITH DISCHARGE
PLANNING NEEDS.

## 2017-11-02 LAB — SPECIMEN PREPARATION: (no result)

## 2017-11-03 NOTE — OP
PATIENT NAME:  LIANET RODRIGUEZ                       MEDICAL RECORD: [id number]
:38                                             LOCATION:D.MS [id number]
                                                         ADMISSION DATE:10/22/17
SURGEON:  ABHI BURROWS MD              
 
 
DATE OF OPERATION:  10/23/2017
 
PROCEDURE:  Fiberoptic bronchoscopy.
 
INDICATION:  Mr. Rodriguez is a 76-year-old gentleman who was recently admitted
with a COPD exacerbation.  He has a CT scan of the chest, which showed
questionable tracheal lesion.  Fiberoptic bronchoscopy was carried out to
inspect the airway and get a specimen for culture and sensitivity.
 
PROCEDURE IN DETAIL:  After getting conscious sedation with TIVA, the fiberoptic
bronchoscope was passed through the mouth.  The epiglottis was normal.  The
vocal cords were normal, moving equally on phonation.  The main trachea was
normal.  There was thick whitish secretions in the trachea.  The erma was
sharp.  The left main bronchus was normal.  There was also thick whitish
secretion in the left main bronchus.  The subsegment to the left upper lobe,
left lower lobe within normal range.  There was bronchitic change bled easily by
touching by the bronchoscope and the patient was coughing.  The right main
bronchus was normal.  There was also thick yellow whitish secretions in the
right main bronchus and the bronchus intermedius.  The subsegment to the right
upper lobe, right middle lobe, right lower lobe within normal range.  No
endobronchial lesion was seen.  The patient has bronchomalacia.  The left main
bronchus and the right main bronchus was totally occluded with coughing and
expiration.  No endobronchial lesion was seen.  Specimen washing was obtained
and sent for routine culture and sensitivity, AFB and fungus and cytology. 
Overall, the patient reports tolerated the procedure well.
 
TRANSINT:[id number] Voice Confirmation ID: [id number] DOCUMENT ID: [id number]
                                           
                                           ABHI BURROWS MD              
 
 
 
Electronically Signed by ABHI BURROWS on 17 at 1035
 
 
 
 
 
 
 
 
 
 
 
CC:                                                             [id number]
DICTATION DATE: 10/31/17 1620     :     10/31/17 1721      DIS IN  
                                                                      [address] [place] [address]

## 2017-11-06 ENCOUNTER — HOSPITAL ENCOUNTER (OUTPATIENT)
Dept: HOSPITAL 84 - D.LABREF | Age: 77
Discharge: HOME | End: 2017-11-06
Attending: INTERNAL MEDICINE
Payer: MEDICARE

## 2017-11-06 VITALS — BODY MASS INDEX: 31.9 KG/M2

## 2017-11-06 DIAGNOSIS — A41.9: Primary | ICD-10-CM

## 2017-11-06 LAB
BASOPHILS NFR BLD AUTO: 0 % (ref 0–2)
BUN SERPL-MCNC: 30 MG/DL (ref 7–18)
CREAT SERPL-MCNC: 0.8 MG/DL (ref 0.6–1.3)
EOSINOPHIL NFR BLD: 0.6 % (ref 0–7)
ERYTHROCYTE [DISTWIDTH] IN BLOOD BY AUTOMATED COUNT: 13.1 % (ref 11.5–14.5)
HCT VFR BLD CALC: 38.4 % (ref 42–54)
HGB BLD-MCNC: 12.7 G/DL (ref 13.5–17.5)
IMM GRANULOCYTES NFR BLD: 0.4 % (ref 0–5)
LYMPHOCYTES NFR BLD AUTO: 11.1 % (ref 15–50)
MCH RBC QN AUTO: 31.3 PG (ref 26–34)
MCHC RBC AUTO-ENTMCNC: 33.1 G/DL (ref 31–37)
MCV RBC: 94.6 FL (ref 80–100)
MONOCYTES NFR BLD: 9.2 % (ref 2–11)
NEUTROPHILS NFR BLD AUTO: 78.7 % (ref 40–80)
PLATELET # BLD: 224 10X3/UL (ref 130–400)
PMV BLD AUTO: 10.6 FL (ref 7.4–10.4)
RBC # BLD AUTO: 4.06 10X6/UL (ref 4.2–6.1)
WBC # BLD AUTO: 9.3 10X3/UL (ref 4.8–10.8)

## 2017-11-20 ENCOUNTER — HOSPITAL ENCOUNTER (OUTPATIENT)
Dept: HOSPITAL 84 - D.LABREF | Age: 77
Discharge: HOME | End: 2017-11-20
Attending: INTERNAL MEDICINE
Payer: MEDICARE

## 2017-11-20 VITALS — BODY MASS INDEX: 31.9 KG/M2

## 2017-11-20 DIAGNOSIS — A49.01: Primary | ICD-10-CM

## 2017-11-20 NOTE — EC
PATIENT:LIANET PATIÑO             DATE OF SERVICE: 10/22/17
SEX: M                                  MEDICAL RECORD: [id number]
DATE OF BIRTH: [birth year]                        LOCATION:D.MS RIZO
AGE OF PATIENT: 76                             ADMISSION DATE: 10/22/17
 
REFERRING PHYSICIAN:                               
 
INTERPRETING PHYSICIAN: OLY MCDANIEL MD              
 
 
 
                             ECHOCARDIOGRAM REPORT
  ECHO CHARGES 4               ECHO COMPLETE            
 
 
 
CLINICAL DIAGNOSIS:                               
 
                         ECHOCARDIOGRAPHIC MEASUREMENTS
      (adult normal given)
   AC root (d.<3.7cm) 3.8  cm   LV Septum d (<1.2 cm> 1.6  cm
      Valve Excursion 1.8  cm     LV Septum (systole) 2.2  cm
Left Atria (s.<4.0cm> 3.0  cm          LVPW d(<1.2cm) 1.5  cm
        RV (d.<2.3cm) 3.3  cm           LVPW (sytole) 2.3  cm
  LV diastole(<5.6CM) 5.8  cm       MV E-F(>70mm/sec)      cm
           LV systole 4.1  cm           LVOT Diameter 2.0  cm
       MV exc.(>10mm)      cm
Est.ejection fraction (50-75%)     %   Pericardial Effusion N
 
   DOPPLER:
     LVIT      cm/sec A 85.0 cm/sec E 101   cm/sec
       LA      cm/sec      RVSP 40.1 mmHg
     LVOT 110  cm/sec   AOP1/2T      m/s
  Asc. Ao 141  cm/sec
     RVOT 79.0 cm/sec
       RA      cm/sec
       PA 98.0 cm/sec
 AV Gradient Peak 8.0  mmHg  AV Mean 3.7  mmHg  AV Area 1.8  cm
 MV Gradient Peak 5.4  mmHg  MV Mean 1.8  mmHg  MV Area      cm
   COMMENTS:                                              
 
 
 Cardiac Sonographer: Kandy WATERS [place]            
      Cardiologist: 4          Dr. Mcdaniel               
             TAPE# [email or web address]           
                                                                                     
 
 
DATE OF SERVICE:  10/25/2017
 
PROCEDURE:  Transthoracic echocardiogram.
 
FINDINGS:
1. The left ventricle has mild-to-moderate concentric left ventricular
hypertrophy.  Inflow characteristics appear to be normal.  The endocardium was
difficult to visualize.  There appears to be mild global hypokinesis, ejection
fraction 45% to 50%.
2. The right ventricle has right ventricular hypertrophy and is mildly dilated.
 
 
 
ECHOCARDIOGRAM REPORT                          [id number]    LIANET PATIÑO     
 
 
3. The left atrium is normal.
4. Aortic valve is normal.  There is a slightly dilated aortic root.
5. Tricuspid valve has mild tricuspid regurgitation.  The RVSP is 40 mmHg.
 
CONCLUSIONS:  The patient's echo quality was difficult to interpret.  The
patient is tachycardic.  There appears to be a mild decrease globally in LV
systolic function and evidence of mild hypertensive heart disease.
 
TRANSINT:[id number] Voice Confirmation ID: [id number] DOCUMENT ID: [id number]
11/01/2017 Edited to correct date of service, dmm. 
                                           
                                           OLY MCDANIEL MD              
 
 
 
Electronically Signed by OLY MCDANIEL on 11/20/17 at 0915
 
 
 
 
 
 
 
 
 
 
 
 
 
 
 
 
 
 
 
 
 
 
 
 
 
 
 
 
 
 
CC:                                                             [id number]
DICTATION DATE: 10/26/17 0943     :     10/26/17 1004      DIS IN  
                                                                      11/01/17
[facility]                                          
[address] [place] [address]

## 2017-11-27 ENCOUNTER — HOSPITAL ENCOUNTER (OUTPATIENT)
Dept: HOSPITAL 84 - D.LABREF | Age: 77
Discharge: HOME | End: 2017-11-27
Attending: INTERNAL MEDICINE
Payer: MEDICARE

## 2017-11-27 VITALS — BODY MASS INDEX: 31.9 KG/M2

## 2017-11-27 DIAGNOSIS — A49.01: Primary | ICD-10-CM

## 2018-06-20 ENCOUNTER — HOSPITAL ENCOUNTER (OUTPATIENT)
Dept: HOSPITAL 84 - D.CATH | Age: 78
End: 2018-06-20
Attending: INTERNAL MEDICINE
Payer: MEDICARE

## 2018-06-20 VITALS
WEIGHT: 280.59 LBS | BODY MASS INDEX: 33.13 KG/M2 | HEIGHT: 77 IN | WEIGHT: 280.59 LBS | HEIGHT: 77 IN | BODY MASS INDEX: 33.13 KG/M2

## 2018-06-20 VITALS — DIASTOLIC BLOOD PRESSURE: 74 MMHG | SYSTOLIC BLOOD PRESSURE: 128 MMHG

## 2018-06-20 DIAGNOSIS — Z01.812: ICD-10-CM

## 2018-06-20 DIAGNOSIS — I48.91: Primary | ICD-10-CM

## 2018-06-20 LAB
ANION GAP SERPL CALC-SCNC: 8.2 MMOL/L (ref 8–16)
BASOPHILS NFR BLD AUTO: 0.2 % (ref 0–2)
BUN SERPL-MCNC: 27 MG/DL (ref 7–18)
CALCIUM SERPL-MCNC: 10 MG/DL (ref 8.5–10.1)
CHLORIDE SERPL-SCNC: 94 MMOL/L (ref 98–107)
CO2 SERPL-SCNC: 36.5 MMOL/L (ref 21–32)
CREAT SERPL-MCNC: 1.1 MG/DL (ref 0.6–1.3)
EOSINOPHIL NFR BLD: 2.1 % (ref 0–7)
ERYTHROCYTE [DISTWIDTH] IN BLOOD BY AUTOMATED COUNT: 15 % (ref 11.5–14.5)
GLUCOSE SERPL-MCNC: 148 MG/DL (ref 74–106)
HCT VFR BLD CALC: 26.7 % (ref 42–54)
HGB BLD-MCNC: 8.8 G/DL (ref 13.5–17.5)
IMM GRANULOCYTES NFR BLD: 0.8 % (ref 0–5)
INR PPP: 3.74 (ref 0.85–1.17)
LYMPHOCYTES NFR BLD AUTO: 20.5 % (ref 15–50)
MCH RBC QN AUTO: 30.1 PG (ref 26–34)
MCHC RBC AUTO-ENTMCNC: 33 G/DL (ref 31–37)
MCV RBC: 91.4 FL (ref 80–100)
MONOCYTES NFR BLD: 7.3 % (ref 2–11)
NEUTROPHILS NFR BLD AUTO: 69.1 % (ref 40–80)
OSMOLALITY SERPL CALC.SUM OF ELEC: 277 MOSM/KG (ref 275–300)
PLATELET # BLD: 100 10X3/UL (ref 130–400)
PMV BLD AUTO: 11.8 FL (ref 7.4–10.4)
POTASSIUM SERPL-SCNC: 3.7 MMOL/L (ref 3.5–5.1)
PROTHROMBIN TIME: 36.2 SECONDS (ref 11.6–15)
RBC # BLD AUTO: 2.92 10X6/UL (ref 4.2–6.1)
SODIUM SERPL-SCNC: 135 MMOL/L (ref 136–145)
WBC # BLD AUTO: 5.2 10X3/UL (ref 4.8–10.8)

## 2019-11-26 ENCOUNTER — HOSPITAL ENCOUNTER (OUTPATIENT)
Dept: HOSPITAL 84 - D.HCCECHO | Age: 79
Discharge: HOME | End: 2019-11-26
Attending: INTERNAL MEDICINE
Payer: MEDICARE

## 2019-11-26 VITALS — BODY MASS INDEX: 33.2 KG/M2

## 2019-11-26 DIAGNOSIS — I10: Primary | ICD-10-CM

## 2020-01-12 NOTE — NUR
PT SALINE LOCKED PER REQUEST AFTER ADMINISTRATION OF HIS LEVAQUIN. PT REMAINS
AWAKE, ALERT, ORIENTED, DENIES ANY OTHER NEEDS. PT IS EATING AND DRINKING WNL
AND LIKES TO BE SL FOR EASIER AMBULATION TO BATHROOM. PT DENIES ANY OTHER
NEEDS. CONTINUE TO MONITOR CLOSELY. CAD (coronary artery disease)    Diabetes    Dialysis patient    Hypertension    Renal failure

## 2021-03-24 ENCOUNTER — HOSPITAL ENCOUNTER (OUTPATIENT)
Dept: HOSPITAL 84 - D.HCCARDIO | Age: 81
Discharge: HOME | End: 2021-03-24
Attending: INTERNAL MEDICINE
Payer: MEDICARE

## 2021-03-24 VITALS — BODY MASS INDEX: 33.2 KG/M2

## 2021-03-24 DIAGNOSIS — I25.10: Primary | ICD-10-CM

## 2021-04-05 ENCOUNTER — HOSPITAL ENCOUNTER (OUTPATIENT)
Dept: HOSPITAL 84 - D.CATH | Age: 81
Discharge: HOME | End: 2021-04-05
Payer: MEDICARE

## 2021-04-05 DIAGNOSIS — Z53.9: ICD-10-CM

## 2021-04-05 DIAGNOSIS — R94.39: Primary | ICD-10-CM

## 2021-04-05 LAB
ALT SERPL-CCNC: 20 U/L (ref 10–68)
ANION GAP SERPL CALC-SCNC: 9.9 MMOL/L (ref 8–16)
BASOPHILS NFR BLD AUTO: 0.6 % (ref 0–2)
BUN SERPL-MCNC: 26 MG/DL (ref 7–18)
CALCIUM SERPL-MCNC: 9.5 MG/DL (ref 8.5–10.1)
CHLORIDE SERPL-SCNC: 95 MMOL/L (ref 98–107)
CHOLEST/HDLC SERPL: 2.4 RATIO (ref 2.3–4.9)
CO2 SERPL-SCNC: 35.6 MMOL/L (ref 21–32)
CREAT SERPL-MCNC: 1.1 MG/DL (ref 0.6–1.3)
EOSINOPHIL NFR BLD: 4.9 % (ref 0–7)
ERYTHROCYTE [DISTWIDTH] IN BLOOD BY AUTOMATED COUNT: 13 % (ref 11.5–14.5)
GLUCOSE SERPL-MCNC: 152 MG/DL (ref 74–106)
HCT VFR BLD CALC: 36.2 % (ref 42–54)
HDLC SERPL-MCNC: 88 MG/DL (ref 32–96)
HGB BLD-MCNC: 12.1 G/DL (ref 13.5–17.5)
IMM GRANULOCYTES NFR BLD: 0 % (ref 0–5)
INR PPP: 2.42 (ref 0.85–1.17)
LDL-HDL RATIO: 1.1 RATIO (ref 1.5–3.5)
LDLC SERPL-MCNC: 93 MG/DL (ref 0–100)
LYMPHOCYTES # BLD: 1.48 10X3/UL (ref 1.32–3.57)
LYMPHOCYTES NFR BLD AUTO: 30.4 % (ref 15–50)
MCH RBC QN AUTO: 32.5 PG (ref 26–34)
MCHC RBC AUTO-ENTMCNC: 33.4 G/DL (ref 31–37)
MCV RBC: 97.3 FL (ref 80–100)
MONOCYTES NFR BLD: 14.4 % (ref 2–11)
NEUTROPHILS # BLD: 2.42 10X3/UL (ref 1.78–5.38)
NEUTROPHILS NFR BLD AUTO: 49.7 % (ref 40–80)
OSMOLALITY SERPL CALC.SUM OF ELEC: 281 MOSM/KG (ref 275–300)
PLATELET # BLD: 165 10X3/UL (ref 130–400)
PMV BLD AUTO: 10 FL (ref 7.4–10.4)
POTASSIUM SERPL-SCNC: 3.5 MMOL/L (ref 3.5–5.1)
PROTHROMBIN TIME: 24.4 SECONDS (ref 11.6–15)
RBC # BLD AUTO: 3.72 10X6/UL (ref 4.2–6.1)
SODIUM SERPL-SCNC: 137 MMOL/L (ref 136–145)
TRIGL SERPL-MCNC: 158 MG/DL (ref 30–200)
WBC # BLD AUTO: 4.9 10X3/UL (ref 4.8–10.8)

## 2021-04-12 ENCOUNTER — HOSPITAL ENCOUNTER (OUTPATIENT)
Dept: HOSPITAL 84 - D.CATH | Age: 81
Discharge: HOME | End: 2021-04-12
Attending: INTERNAL MEDICINE
Payer: MEDICARE

## 2021-04-12 VITALS — BODY MASS INDEX: 29.94 KG/M2 | WEIGHT: 258.74 LBS | HEIGHT: 78 IN | BODY MASS INDEX: 29.94 KG/M2

## 2021-04-12 VITALS — DIASTOLIC BLOOD PRESSURE: 79 MMHG | SYSTOLIC BLOOD PRESSURE: 154 MMHG

## 2021-04-12 DIAGNOSIS — I48.91: ICD-10-CM

## 2021-04-12 DIAGNOSIS — R94.39: ICD-10-CM

## 2021-04-12 DIAGNOSIS — E11.9: ICD-10-CM

## 2021-04-12 DIAGNOSIS — Z95.0: ICD-10-CM

## 2021-04-12 DIAGNOSIS — I10: ICD-10-CM

## 2021-04-12 DIAGNOSIS — I25.119: Primary | ICD-10-CM

## 2021-04-12 DIAGNOSIS — R06.09: ICD-10-CM

## 2021-04-12 DIAGNOSIS — I34.0: ICD-10-CM

## 2021-04-12 DIAGNOSIS — Z95.5: ICD-10-CM

## 2021-04-12 LAB
ALT SERPL-CCNC: 19 U/L (ref 10–68)
ANION GAP SERPL CALC-SCNC: 10.7 MMOL/L (ref 8–16)
BASOPHILS NFR BLD AUTO: 0.5 % (ref 0–2)
BUN SERPL-MCNC: 25 MG/DL (ref 7–18)
CALCIUM SERPL-MCNC: 10 MG/DL (ref 8.5–10.1)
CHLORIDE SERPL-SCNC: 96 MMOL/L (ref 98–107)
CHOLEST/HDLC SERPL: 2.4 RATIO (ref 2.3–4.9)
CO2 SERPL-SCNC: 32.7 MMOL/L (ref 21–32)
CREAT SERPL-MCNC: 1 MG/DL (ref 0.6–1.3)
EOSINOPHIL NFR BLD: 3.2 % (ref 0–7)
ERYTHROCYTE [DISTWIDTH] IN BLOOD BY AUTOMATED COUNT: 12.9 % (ref 11.5–14.5)
GLUCOSE SERPL-MCNC: 142 MG/DL (ref 74–106)
HCT VFR BLD CALC: 37.8 % (ref 42–54)
HDLC SERPL-MCNC: 86 MG/DL (ref 32–96)
HGB BLD-MCNC: 12.5 G/DL (ref 13.5–17.5)
IMM GRANULOCYTES NFR BLD: 0.2 % (ref 0–5)
INR PPP: 1.23 (ref 0.85–1.17)
LDL-HDL RATIO: 1.1 RATIO (ref 1.5–3.5)
LDLC SERPL-MCNC: 94 MG/DL (ref 0–100)
LYMPHOCYTES # BLD: 1.44 10X3/UL (ref 1.32–3.57)
LYMPHOCYTES NFR BLD AUTO: 25.9 % (ref 15–50)
MCH RBC QN AUTO: 32 PG (ref 26–34)
MCHC RBC AUTO-ENTMCNC: 33.1 G/DL (ref 31–37)
MCV RBC: 96.7 FL (ref 80–100)
MONOCYTES NFR BLD: 18.2 % (ref 2–11)
NEUTROPHILS # BLD: 2.88 10X3/UL (ref 1.78–5.38)
NEUTROPHILS NFR BLD AUTO: 52 % (ref 40–80)
OSMOLALITY SERPL CALC.SUM OF ELEC: 277 MOSM/KG (ref 275–300)
PLATELET # BLD: 185 10X3/UL (ref 130–400)
PMV BLD AUTO: 10 FL (ref 7.4–10.4)
POTASSIUM SERPL-SCNC: 3.4 MMOL/L (ref 3.5–5.1)
PROTHROMBIN TIME: 14.3 SECONDS (ref 11.6–15)
RBC # BLD AUTO: 3.91 10X6/UL (ref 4.2–6.1)
SODIUM SERPL-SCNC: 136 MMOL/L (ref 136–145)
TRIGL SERPL-MCNC: 142 MG/DL (ref 30–200)
WBC # BLD AUTO: 5.6 10X3/UL (ref 4.8–10.8)

## 2021-04-12 NOTE — NUR
R GROIN EXOSEAL SITE SOFT, NO S/S BLEEDING OR HEMATOMA. R LEG/FOOT
WARM WITH PALP PULSES AND BRISK CAP REFILL. VSS. PT DENIES PAIN OR
NEEDS. ALARMS ON AND C/L IN REACH.

## 2021-04-12 NOTE — HEMODYNAMI
PATIENT:LIANET PATIÑO                              MEDICAL RECORD: [id number]
: 38                                            LOCATION:[facility]          
ACCT# [id number]                                       ADMISSION DATE: 21
 
 
 Generatedon:113:46
Patient name: LIANET PATIÑO Patient #: [id number] Visit #: [id number] SSN: [id number]
 :
1940 Date of study: 2021
Page: Of
Hemodynamic Procedure Report
****************************
Patient Data
Patient Demographics
Procedure consent was obtained
First Name: LIANET           Gender: Male
Last Name: HAROON           : 1940
Middle Initial: JAY      Age: 80 year(s)
Patient #: [id number]       Race: 
Visit #: [id number]
SSN: 
Accession #:
[id number]
Additional ID: [id number]
Contact details
Address: [address] [place] [address]      Phone: [phone]
State: [place]
City: [place]
Zip code: [id number]
Past Medical History
History of disease
Date         Diagnosis          Comments
CAD
Allergies
Allergen        Reaction        Date         Comments
Reported
Other allergy                   2021     TREE POLLEN
Other allergy                   2021    pollen
Admission
Admission Data
Admission Date: 2021   Admission Time: 11:02
Arrival Date: 2021     Arrival Time: 13:00
Admit Source: Other         Insurance Payor: Medicare
HIC #: [id number]
Height (in.): 77.95         BSA: 2.51 (m2)
Height (cm.): 198           BMI: 29.84 (kg/m2)
Weight (lbs.): 257.94
Weight (kg.): 117
Lab Results
Lab Result Date: 2021  Lab Result Time: 0:00
Biochemistry
Name         Units    Result                Min      Max
BUN          mg/dl    25       --(----)-*   7        18
Creatinine   mg/dl    1        --(--*-)--   0.6      1.3
CBC
Name         Units    Result                Min      Max
Hemoglobin   g/dl     12.5     *-(----)--   13.5     17.5
Procedure
Procedure Types
 
Cath Procedure
Diagnostic Procedure
[facility]
Coronaries only
Aortic Root Angiography
Sedation Charges
Moderate Sedation 10-24 minutes
Procedure Description
Procedure Date
Procedure Date: 2021
Procedure Start Time: 13:23
Procedure End Time: 13:44
Procedure Staff
Name                            Function
Ryan Amaya MD                   Performing Physician
Karis Gallardo RT                Monitor
Susie Courtney RT                    Scrub
Elissa Feldman RN                  Nurse
Procedure Data
Cath Procedure
Fluoroscopy
Diagnostic fluoroscopy      Total fluoroscopy Time: 5
time: 5 min                 min
Diagnostic fluoroscopy      Total fluoroscopy dose: 881
dose: 881 mGy               mGy
Contrast Material
Contrast Material Type                       Amount (ml)
Isovue 370                                   85
Entry Location
Entry     Primary  Successful  Side  Size  Upsize Upsize Entry    Closure Succes
sful  Closure
Location                             (Fr)  1 (Fr) 2 (Fr) Remarks  Device        
      Remarks
Femoral                        Right 5 Fr                         Exoseal
artery
Estimated blood loss: 5 ml
Diagnostic catheters
Device Type               Used For           End Catheter
Placement
MULTIPACK JL 4.0 5Fr      Procedure
catheter
MULTIPACK 3DRC 5Fr        Procedure
catheter
DIAGNOSTIC AR MOD 5Fr     Procedure
Catheter (695872F)
MULTIPACK Pigtail 5 Fr    Procedure
catheter
Procedure Complications
No complications
Procedure Medications
Medication           Administration Route Dosage
Oxygen               etCO2 Nasal cannula  2 l/min
Lidocaine 2%         added to field       20
Heparin Flush Bag    added to field       2 bags
(1000units/500ml NS)
0.9% NaCl            I.V.                 100 ml/hr
Versed               I.V.                 1 mg
Fentanyl             I.V.                 50 mcg
Versed               I.V.                 1 mg
Fentanyl             I.V.                 50 mcg
 
Hemodynamics
Rest
BSA: 2.51 (m2) HGB: 12.5 (g/dl) O2 Consumption: Estimated: 341.36 (ml/min) O2 Co
nsumption
indexed: Estimated:136 (ml/min/m) Heart Rate: 0 (bpm)
Snapshots
Pre Cath      Intra         NCS           Post Cath
Vital Signs
Time     Heart  Resp   SPO2 etCO2   NIBP (mmHg) Rhythm  Pain    Sedation
Rate   (ipm)  (%)  (mmHg)                      Status  Level
(bpm)
13:15:26 71     18     96   0       148/79(110) NSR     0 (11)  10(A)
, No
pain
13:19:48 73     17     98   38.8    124/64(100) NSR     0 (11)  10(A)
, No
pain
13:24:06 63     12     99   0       122/59(79)  NSR     0 (11)  10(A)
, No
pain
13:28:24 72     14     100  40.3    131/72(109) NSR     0 (11)  9(A)
, No
pain
13:32:44 77     17     99   37.3    129/70(100) NSR     0 (11)  9(A)
, No
pain
13:38:30 74     15     99   33.6    123/58(89)  NSR     0 (11)  10(A)
, No
pain
13:42:44 66     14     100  39.5    121/59(90)  NSR     0 (11)  10(A)
, No
pain
Medications
Time     Medication       Route   Dose  Verified Delivered Reason     Notes  Eff
ectiveness
by       by
13:14:17 Oxygen           etCO2   2     Ryan     Buffie    used for
Nasal   l/min Jose Luis Feldman RN   procedure
cannula
13:14:24 Lidocaine 2%     added   20ml  Ryan     Ryan      for local
to      vial  Jose Luis Amaya MD  anesthetic
field
13:14:30 Heparin Flush    added   2     Ryan     Ryan      used for
Bag              to      bags  Jose Luis Amaya MD  procedure
(1000units/500ml field
NS)
13:14:39 0.9% NaCl        I.V.    100   Ryan     Buffie    Per
ml/hr Jose Luis Feldman RN   physician
13:21:00 Fentanyl         I.V.    50    Ryan     Buffie    for
mcg   Jose Luis Feldman RN   sedation
13:21:54 Versed           I.V.    1 mg  Ryan     Buffie    for
Jose Luis Feldman RN   sedation
13:25:22 Versed           I.V.    1 mg  Ryan     Buffie    for
Jose Luis Feldman RN   sedation
13:25:26 Fentanyl         I.V.    50    Ryan     Buffie    for
mcg   Jose Luis Feldman RN   sedation
Procedure Log
Time     Note
12:53:14 Informed consent obtained and on chart
12:55:19 Arrival Date: 2021 1:00:00 PM
 
12:55:40 Admit Source: Other
12:55:43 Insurance Payor : Medicare
12:56:50 Patient Height : 77.95 inches
12:56:54 Patient Weight : 257.94 lbs
12:57:21 Diagnostic Cath Status : Elective
12:58:28 Lab Result : BUN 25 mg/dl
12:58:28 Lab Result : Hemoglobin 12.5 g/dl
12:58:28 Lab Result : Creatinine 1 mg/dl
12:58:35 Procedure Status Elective Heart Cath (OP).
12:58:38 Susie Courtney RT(R) sent for patient. Start room use.
12:58:39 Time tracking: Regular hours (M-F 7:00 - 5:00)
12:58:44 Plan of Care:Hemodynamics will remain stable., Cardiac
rhythm will remain stable., Comfort level will be
maintained., Respiratory function will remain
adequate., Patient/ family verbilizes understanding of
procedure., Procedure tolerated without complication.,
Recovers from procedure without complications..
13:11:35 Patient received from Pre/Post Procedure Room to CCL 1
Alert and oriented. Tansferred to table in Supine
position.
13:14:06 Vital chart was started
13:14:17 Oxygen 2 l/min etCO2 Nasal cannula was administered by
Elissa Feldman RN; used for procedure; Verbal order read
back and verified.
13:14:24 Lidocaine 2% 20ml vial added to field was administered
by Ryan Amaya MD; for local anesthetic; Verbal order
read back and verified.
13:14:30 Heparin Flush Bag (1000units/500ml NS) 2 bags added to
field was administered by Ryan Amaya MD; used for
procedure; Verbal order read back and verified.
13:14:39 0.9% NaCl 100 ml/hr I.V. was administered by Elissa Feldman RN; Per physician; Verbal order read back and
verified.
13:16:30 **ACC** Patient presents with Stable Angina CCS
Anginal Class 2--Slight limitation of ordinary
activity.
13:16:55 Warm blankets applied, and joon hugger turned on for
patient comfort.
13:16:55 Correct patient and procedure confirmed by team.
13:16:56 ECG and BP/O2 sat monitors applied to patient.
13:16:57 Full Disclosure recording started
13:17:02 Rhythm: paced
13:17:19 H&P Date Dictated: 2021 Within 30 days and on
chart..
13:17:20 Pre-procedure instructions explained to patient.
13:17:21 Pre-op teaching completed and patient verbalized
understanding.
13:17:23 Family unavailable.
13:17:24 Patient NPO since Midnight.
13:17:40 Patient allergic to Other allergypollen
13:17:49 Baseline sample Acquired.
13:17:56 Is the patient allergic to Iodine/contrast media? No.
13:18:00 Was the patient premedicated? Yes
13:18:02 Is patient on blood thinner?Yes
13:18:12 **ACC** The patient was administered the following
blood thiners within the last 24 hours: Coumadin
13:18:15 Patient diabetic? Yes.
13:18:20 If diabetic: On Metformin? Yes
13:18:22 ----Pre-sedation anethsthesia assessment.----
13:18:25 Snore? Yes
 
13:18:28 Sleep apnea? Unknown
13:18:28 Previous problem with sedation/anesthesia? No ?
13:18:30 Deviated septum? No
13:18:32 Opens mouth fully? Yes
13:18:34 Sticks out tongue? Yes
13:18:36 Airway obstruction? No ?
13:18:37 Dentures? No ?
13:18:45 Pre procedure: right dorsailis pedis pulse 1+
Palpable, but thready & weak; easily obliterated
13:18:48 Patient pain scale 0/10 ?.
13:18:54 IV patent on arrival in left antecubital with 0.9%
NaCl at O.
13:18:56 Lab results completed and on chart.
13:20:30 Stress Test: yes; abnormal INFERIOR
13:20:40 Right groin area was prepped with chlora-prep and
draped in sterile fashion
13:20:41 Alarms reviewed by R. N.
13:20:41 Sharps counted by scrub and verified by R.N.
13:20:42 --------ALL STOP TIME OUT------
13:20:43 Final Timeout: patient, procedure, and site verified
with staff and physician. All members of the team are
in agreement.
13:20:45 Right groin site verified by team.
13:20:51 Fire Safety Assessment: A--An alcohol-based skin
anteseptic being used preoperatively., C--Open oxygen
or nitrous oxide is being used., D--An ESU, laser, or
fiber-optic light is being used.
13:20:54 Physical assessment completed. ASA score P 2 - A
patient with mild systemic disease as per Ryan Amaya MD.
13:21:00 Fentanyl 50 mcg I.V. was administered by Elissa Feldman RN; for sedation; Verbal order read back and verified.
13:21:02 2) 60-89 Mildly reduced kidney function, and other
findings (as for stage 1) point to kidney disease.
13:21:16 Maximum allowable contrast dose (3.7 X eGFR X 0.75)211
ml.
13:21:20 Sedation plan: IV Moderate Sedation Medication:Versed,
Fentanyl
13::23 Use device set Femoral Dx
13:21:24 ACIST Syringe (95812) opened to sterile field.
13:21:25 Bag Decanter (2002S) opened to sterile field.
13:21:26 Medline Cath Pack ([id number]) opened to sterile field.
13:21:27 ACIST Hand Control (65792) opened to sterile field.
13:21:27 ACIST Manifold (66644) opened to sterile field.
13:21:28 DIAGNOSTIC Multipack 5Fr catheter set ([id number]) opened
to sterile field.
13:21:29 Tegaderm 4 x 4 (1626W) opened to sterile field.
13:21:29 SHEATH 5FR [place] (OYI200) opened to sterile field.
13:21:30 EMERALD Guide Wire (954-152) opened to sterile field.
13:21:54 Versed 1 mg I.V. was administered by Elissa Feldman RN;
for sedation; Verbal order read back and verified.
13:23:28 Procedure started.
13:23:32 Local anesthetic to right femoral artery with
Lidocaine 2% by Ryan Amaya MD.**INITIAL ACCESS ONLY**
13:24:17 A 5 Fr sheath was inserted into the Right Femoral
artery
13:25:22 Versed 1 mg I.V. was administered by Elissa Feldman RN;
for sedation; Verbal order read back and verified.
13:25:26 Fentanyl 50 mcg I.V. was administered by Elissa Feldman
RN; for sedation; Verbal order read back and verified.
 
13:25:37 A MULTIPACK JL 4.0 5Fr catheter was advanced over the
wire and used for Procedure.
13:27:20 LCA angiography performed.
13:27:24 Injector settings: Ml/sec: 3, Volume: 6,
13:28:25 Catheter exchanged over wire.
13:29:00 A MULTIPACK 3DRC 5Fr catheter was advanced over the
wire and used for Procedure.
13:30:46 RCA angiography performed.
13:32:08 A DIAGNOSTIC AR MOD 5Fr Catheter ([id number]) was
advanced over the wire and used for Procedure.
13:33:00 RCA angiography performed.
13:33:03 Injector settings: Ml/sec: 3, Volume: 6,
13:33:26 Catheter exchanged over wire.
13:33:33 A MULTIPACK Pigtail 5 Fr catheter was advanced over
the wire and used for Procedure.
13:37:42 Aortic Root visualized
13:37:45 Injector settings: Ml/sec: 10, Volume: 20,
13:38:45 Catheter removed.
13:38:53 EXOSEAL 5Fr () opened to sterile field.
13:39:05 Sheath removed intact; hemostasis achieved with
Exoseal to the Right Femoral artery.
13:40:17 Fluoroscopy time 05.00 minutes.
13:40:21 Fluoroscopy dose: 881 mGy
13:40:21 Flurop Dose total: 881
13:40:34 Dose Area Product 83017 mGy/cm.
13:40:36 Procedure ended.(Physican Out)
13:41:26 Contrast amount:Isovue 370 85ml.
13:41:34 Maximum allowable dose exceeded? No.
13:41:35 Sharps counted by scrub and verified by R.N.
13:41:41 Post-op/insertion site Right Femoral artery dressed
using a 4 x 4 and Tegaderm.
13:41:45 Post right femoral artery:stable, soft, clean and dry
13:41:47 Post Procedure Pulses reassessed and unchanged
13:41:50 Post procedure: right dorsailis pedis pulse 2+ Normal;
easily identifiable; not easily obliterated.
13:41:55 Post-procedure physical assessment completed. ASA
score P 2 - A patient with mild systemic disease as
per Ryan Amaya MD.
13:43:06 Post procedure rhythm: unchanged.
13:43:09 Estimated blood loss: 5 ml
13:43:11 Post procedure instruction explained to
patient.Patient verbalizes understanding.
13:43:11 Patient needs reinforcement of post procedure
teaching.
13:43:29 Procedure type changed to Cath procedure, Diagnostic
procedure, C, Coronaries only, Aortic Root
Angiography, Sedation Charges, Moderate Sedation 10-24
minutes
13:44:07 Procedure and supply charges have been captured,
reviewed, submitted and are correct.
13:44:10 Procedure Complication : No complications
13:44:13 Vital chart was stopped
13:44:15 [facility] Findings: mild to moderate CAD (<70%)
13:44:19 Operative report dictated upon procedure completion.
13:44:19 See physician's report for complete and final results.
13:44:21 Report given to Pre/Post Procedure Room.
13:44:25 Patient transfered to Pre/Post Procedure Room with
Stretcher.
13:44:27 Procedure ended.
13:44:27 Full Disclosure recording stopped
 
13:45:59 End room use (Document Last)
13:46:09 End room use (Document Last)
13:46:33 End room use (Document Last)
Device Usage
Item Name   Manufacture  Quantity  Catalog    Hospital Part    Current Minimal L
ot# /
Number     Charge   Number  Stock   Stock   Serial#
Code
ACIST       Acist        1         55545      393662   563318  120430  20
Syringe     Medical
(64717)     Systems Inc
Bag         Microtek     1               052966   61887   578540  5
Decanter    Medical Inc.
()
Medline     Medline      1         [id number]  960202   78253   896637  5
Cath Pack
([id number])
ACIST Hand  Acist        1         11411      999545   057115  004761  5
Control     Medical
(61948)     Systems Inc
ACIST       Acist        1         95672      065484   456247  739393  5
Manifold    Medical
(49525)     Systems Inc
DIAGNOSTIC  Cardinal     1         GJ5369     124545   43712   343072  30
Multipack   Health
5Fr
catheter
set
([id number])
Tegaderm 4  3M           1         1626W      661901   461259  575670  5
x 4 (1626W)
SHEATH 5FR  Terumo       1         DST570     958310   139813  995069  5
[place]
(RXN493)
EMERALD     Cardinal     1         502-455    379056   671105  137344  5
Guide Wire  Health
(502-455)
MULTIPACK   Cardinal     1                                     000858  5
JL 4.0 5Fr  Health
catheter
MULTIPACK   Cardinal     1                                     079362  5
3DRC 5Fr    Health
catheter
DIAGNOSTIC  Cardinal     1         821961J    385639   160705  446994  15
AR MOD 5Fr  Health
Catheter
(549932H)
MULTIPACK   Cardinal     1                                     524475  5
Pigtail 5   Health
Fr catheter
EXOSEAL 5Fr Cardinal     1               290240   158343  115658  10
()     Health
Signature Audit [place]
Stage           Time        Signature      Unsigned
Intra-Procedure 2021   Karis Gallardo
1:46:09 PM  RT(R)
Intra-Procedure 2021   Elissa Feldman RN
1:46:33 PM
 
Intra-Procedure 2021   Ryan Amaya MD
1:46:54 PM
 
 
 
 
 
 
 
 
 
 
 
 
 
 
 
 
 
 
 
 
 
 
 
 
 
 
 
 
 
 
 
 
 
 
 
 
 
 
 
 
 
 
 
 
 
 
 
 
 
 
 
 
 
[facility]                                          
[address] [place] [address]

## 2021-04-12 NOTE — NUR
PT ATE ALL OF SANDWICH. VSS. R GROIN SITE SOFT, C/D/I, PULSES PALP.
ALL DISCHARGE INSTRUCTIONS REVIEWED INCLUDING RESTRICTIONS, MEDS AND
F/U APPT. PT VERBALIZES UNDERSTANDING. NO OTHER QUESTIONS AT THIS
TIME.

## 2021-04-12 NOTE — NUR
R GROIN SITE SOFT, NO S/S BLEEDING OR HEMATOMA, PULSES PALP. HOB
ELEVATED. SANDWICH TRAY PROVIDED AND FRESH WATER. VSS. CM - PACED, NO
ECTOPY NOTED.  C/L IN REACH.

## 2021-04-12 NOTE — NUR
I SPOKE WITH PTS WIFE, PLAN FOR D/C AT 1600.  R GROIN SITE SOFT,
C/D/I. PULSES PALP. VSS. PT TOLERATING DIET, DENIES NEEDS. C/L IN
REACH.

## 2021-04-12 NOTE — NUR
R GROIN EXOSEAL SITE SOFT, NO S/S BLEEDING OR HEMATOMA. R LEG/FOOT
WARM WITH PALP PULSES AND BRISK CAP REFILL. PT AWAKENS EASILY, DENIES
PAIN OR NEEDS.  ALARMS ON AND C/L IN REACH.

## 2021-04-12 NOTE — NUR
PT REC'D TO CATH RECOVERY ROOM 5 VIA STRETCHER. MONITORS ESTAB. VSS.
SEE POST CATH RN ASSESSMENT FLOWSHEETS.  ALARMS ON AND C/L IN REACH.